# Patient Record
Sex: MALE | Race: WHITE
[De-identification: names, ages, dates, MRNs, and addresses within clinical notes are randomized per-mention and may not be internally consistent; named-entity substitution may affect disease eponyms.]

---

## 2017-01-16 ENCOUNTER — HOSPITAL ENCOUNTER (OUTPATIENT)
Dept: HOSPITAL 17 - PHED | Age: 42
Setting detail: OBSERVATION
Discharge: LEFT BEFORE DISCHARGE | End: 2017-01-16
Attending: HOSPITALIST | Admitting: HOSPITALIST
Payer: MEDICAID

## 2017-01-16 VITALS
DIASTOLIC BLOOD PRESSURE: 82 MMHG | SYSTOLIC BLOOD PRESSURE: 138 MMHG | OXYGEN SATURATION: 100 % | HEART RATE: 91 BPM | RESPIRATION RATE: 18 BRPM

## 2017-01-16 VITALS
OXYGEN SATURATION: 97 % | SYSTOLIC BLOOD PRESSURE: 127 MMHG | DIASTOLIC BLOOD PRESSURE: 78 MMHG | HEART RATE: 74 BPM | RESPIRATION RATE: 18 BRPM

## 2017-01-16 VITALS
OXYGEN SATURATION: 96 % | RESPIRATION RATE: 18 BRPM | DIASTOLIC BLOOD PRESSURE: 84 MMHG | HEART RATE: 78 BPM | SYSTOLIC BLOOD PRESSURE: 133 MMHG

## 2017-01-16 VITALS
OXYGEN SATURATION: 99 % | DIASTOLIC BLOOD PRESSURE: 82 MMHG | SYSTOLIC BLOOD PRESSURE: 128 MMHG | HEART RATE: 94 BPM | RESPIRATION RATE: 18 BRPM

## 2017-01-16 VITALS
OXYGEN SATURATION: 98 % | DIASTOLIC BLOOD PRESSURE: 84 MMHG | SYSTOLIC BLOOD PRESSURE: 127 MMHG | HEART RATE: 87 BPM | RESPIRATION RATE: 18 BRPM

## 2017-01-16 VITALS
DIASTOLIC BLOOD PRESSURE: 77 MMHG | SYSTOLIC BLOOD PRESSURE: 154 MMHG | HEART RATE: 85 BPM | RESPIRATION RATE: 18 BRPM | OXYGEN SATURATION: 97 %

## 2017-01-16 VITALS — DIASTOLIC BLOOD PRESSURE: 66 MMHG | SYSTOLIC BLOOD PRESSURE: 119 MMHG

## 2017-01-16 VITALS
RESPIRATION RATE: 18 BRPM | OXYGEN SATURATION: 95 % | HEART RATE: 76 BPM | DIASTOLIC BLOOD PRESSURE: 85 MMHG | SYSTOLIC BLOOD PRESSURE: 134 MMHG

## 2017-01-16 VITALS — RESPIRATION RATE: 18 BRPM

## 2017-01-16 VITALS
OXYGEN SATURATION: 95 % | DIASTOLIC BLOOD PRESSURE: 64 MMHG | HEART RATE: 69 BPM | SYSTOLIC BLOOD PRESSURE: 136 MMHG | RESPIRATION RATE: 18 BRPM

## 2017-01-16 DIAGNOSIS — Z86.73: ICD-10-CM

## 2017-01-16 DIAGNOSIS — I25.10: ICD-10-CM

## 2017-01-16 DIAGNOSIS — R94.31: ICD-10-CM

## 2017-01-16 DIAGNOSIS — I10: ICD-10-CM

## 2017-01-16 DIAGNOSIS — G45.9: Primary | ICD-10-CM

## 2017-01-16 DIAGNOSIS — Z82.49: ICD-10-CM

## 2017-01-16 DIAGNOSIS — Z72.0: ICD-10-CM

## 2017-01-16 DIAGNOSIS — I25.2: ICD-10-CM

## 2017-01-16 DIAGNOSIS — G56.02: ICD-10-CM

## 2017-01-16 LAB
AMPHETAMINE, URINE: (no result)
ANION GAP SERPL CALC-SCNC: 7 MEQ/L (ref 5–15)
APTT BLD: 26 SEC (ref 24.3–30.1)
BARBITURATES, URINE: (no result)
BASOPHILS # BLD AUTO: 0 TH/MM3 (ref 0–0.2)
BASOPHILS NFR BLD: 0.6 % (ref 0–2)
BUN SERPL-MCNC: 10 MG/DL (ref 7–18)
CHLORIDE SERPL-SCNC: 104 MEQ/L (ref 98–107)
CK SERPL-CCNC: 46 U/L (ref 39–308)
COCAINE UR-MCNC: (no result) NG/ML
COLOR UR: YELLOW
EOSINOPHIL # BLD: 0.2 TH/MM3 (ref 0–0.4)
EOSINOPHIL NFR BLD: 3.2 % (ref 0–4)
ERYTHROCYTE [DISTWIDTH] IN BLOOD BY AUTOMATED COUNT: 14.6 % (ref 11.6–17.2)
GFR SERPLBLD BASED ON 1.73 SQ M-ARVRAT: 107 ML/MIN (ref 89–?)
GLUCOSE UR STRIP-MCNC: 500 MG/DL
HCO3 BLD-SCNC: 32.3 MEQ/L (ref 21–32)
HCT VFR BLD CALC: 45.6 % (ref 39–51)
HDLC SERPL-MCNC: 29.6 MG/DL (ref 40–60)
HEMO FLAGS: (no result)
HGB UR QL STRIP: (no result)
INR PPP: 0.9 RATIO
KETONES UR STRIP-MCNC: (no result) MG/DL
LYMPHOCYTES # BLD AUTO: 2.3 TH/MM3 (ref 1–4.8)
LYMPHOCYTES NFR BLD AUTO: 29.9 % (ref 9–44)
MCH RBC QN AUTO: 27.6 PG (ref 27–34)
MCHC RBC AUTO-ENTMCNC: 32.8 % (ref 32–36)
MCV RBC AUTO: 84 FL (ref 80–100)
METHOD OF COLLECTION: (no result)
MONOCYTES NFR BLD: 7.2 % (ref 0–8)
MUCOUS THREADS #/AREA URNS LPF: (no result) /LPF
NEUTROPHILS # BLD AUTO: 4.6 TH/MM3 (ref 1.8–7.7)
NEUTROPHILS NFR BLD AUTO: 59.1 % (ref 16–70)
NITRITE UR QL STRIP: (no result)
PLATELET # BLD: 238 TH/MM3 (ref 150–450)
POTASSIUM SERPL-SCNC: 3.1 MEQ/L (ref 3.5–5.1)
PROTHROMBIN TIME: 10 SEC (ref 9.8–11.6)
RBC # BLD AUTO: 5.43 MIL/MM3 (ref 4.5–5.9)
SODIUM SERPL-SCNC: 143 MEQ/L (ref 136–145)
SP GR UR STRIP: 1.03 (ref 1–1.03)
SQUAMOUS #/AREA URNS HPF: (no result) /HPF (ref 0–5)
WBC # BLD AUTO: 7.7 TH/MM3 (ref 4–11)

## 2017-01-16 PROCEDURE — 93005 ELECTROCARDIOGRAM TRACING: CPT

## 2017-01-16 PROCEDURE — 80061 LIPID PANEL: CPT

## 2017-01-16 PROCEDURE — 84484 ASSAY OF TROPONIN QUANT: CPT

## 2017-01-16 PROCEDURE — 80307 DRUG TEST PRSMV CHEM ANLYZR: CPT

## 2017-01-16 PROCEDURE — 85025 COMPLETE CBC W/AUTO DIFF WBC: CPT

## 2017-01-16 PROCEDURE — G0480 DRUG TEST DEF 1-7 CLASSES: HCPCS

## 2017-01-16 PROCEDURE — 82550 ASSAY OF CK (CPK): CPT

## 2017-01-16 PROCEDURE — 85730 THROMBOPLASTIN TIME PARTIAL: CPT

## 2017-01-16 PROCEDURE — 85610 PROTHROMBIN TIME: CPT

## 2017-01-16 PROCEDURE — 96374 THER/PROPH/DIAG INJ IV PUSH: CPT

## 2017-01-16 PROCEDURE — 80048 BASIC METABOLIC PNL TOTAL CA: CPT

## 2017-01-16 PROCEDURE — 99285 EMERGENCY DEPT VISIT HI MDM: CPT

## 2017-01-16 PROCEDURE — 81001 URINALYSIS AUTO W/SCOPE: CPT

## 2017-01-16 PROCEDURE — 71010: CPT

## 2017-01-16 PROCEDURE — 70450 CT HEAD/BRAIN W/O DYE: CPT

## 2017-01-16 PROCEDURE — G0378 HOSPITAL OBSERVATION PER HR: HCPCS

## 2017-01-16 PROCEDURE — 96375 TX/PRO/DX INJ NEW DRUG ADDON: CPT

## 2017-01-16 PROCEDURE — 80329 ANALGESICS NON-OPIOID 1 OR 2: CPT

## 2017-01-16 NOTE — RADHPO
EXAM DATE/TIME:  01/16/2017 02:00 

 

[facility] COMPARISON:     

CT BRAIN W/O CONTRAST, November 03, 2016, 9:15.

 

 

INDICATIONS :     

Left frontal headache for two weeks. 

                      

 

RADIATION DOSE:     

61.31 CTDIvol (mGy) 

 

 

 

MEDICAL HISTORY :     

Hypertension. Myocardial infarction. 

 

SURGICAL HISTORY :      

None. 

 

ENCOUNTER:      

Initial

 

ACUITY:      

1 day

 

PAIN SCALE:      

7/10

 

LOCATION:       

Left frontal 

 

TECHNIQUE:     

Multiple contiguous axial images were obtained of the head.  Using automated exposure control and adj
ustment of the mA and/or kV according to patient size, radiation dose was kept as low as reasonably a
chievable to obtain optimal diagnostic quality images. 

 

FINDINGS:     

 

CEREBRUM:     

The ventricles are normal for age.  No evidence of midline shift, mass lesion, hemorrhage or acute in
farction.  No extra-axial fluid collections are seen.

 

POSTERIOR FOSSA:     

The cerebellum and brainstem are intact.  The 4th ventricle is midline.  The cerebellopontine angle i
s unremarkable.

 

EXTRACRANIAL:     

The visualized portion of the orbits is intact.

 

SKULL:     

The calvaria is intact.  No evidence of skull fracture.

 

CONCLUSION:     

Normal examination.  

 

 

 

 Adan Haro MD on January 16, 2017 at 2:20           

Board Certified Radiologist.

 This report was verified electronically.

## 2017-01-16 NOTE — EKG
Date Performed: 01/16/2017       Time Performed: 01:30:44

 

PTAGE:      41 years

 

EKG:      Sinus rhythm 

 

 Inferior ST-T changes are nonspecific Borderline ECG

 

PREVIOUS TRACING       : 11/03/2016 10.55

 

DOCTOR:   Stephen Guzman  Interpretating Date/Time  01/16/2017 19:20:52

## 2017-01-16 NOTE — PD
HPI


Chief Complaint:  Numbness/Tingling


Time Seen by Provider:  01:23


Travel History


International Travel<30 days:  No


Contact w/Intl Traveler<30days:  No


Traveled to known affect area:  No





History of Present Illness


HPI


41-year-old male presents to the emergency department by private transportation 

for complaint of facial paresthesias and sensation of fullness of the face and 

lower lip as well as tingling and numbness affecting the left upper extremity 

especially in the left index and thumb distribution.  Patient reports that for 

the past 2-3 months she's been having headaches and for the past month or so he'

s been having facial tingling and left upper extremity numbness and tingling.  

Patient's had no weakness.  Patient's had no facial droop.  Patient's had no 

change in mentation or vision.  Patient's had no change in speech.  Patient 

states that since starting a new medication for blood pressure approximately a 

month ago paresthesias have begun and he is very concerned that he is having an 

adverse medication reaction.  Patient was seen in the emergency department in 

December for a respiratory illness and started on blood pressure medication.  

Patient has not followed up with his primary care provider since his ER visit.  

Patient states that he has tried to be compliant with his blood pressure 

medication.  Patient reports that wife told him he had a fever of 102 

approximately 2 or 3 days ago.  Patient has experienced facial pressure 

affecting the forehead and cheeks.  Patient reports having had a sore throat 

which she no longer has.  Patient denies any ear pain.  No report of epistaxis.

  Patient's had no chest pain shortness of breath palpitations sweats or nausea 

or vomiting.  Patient denies any lower extremity symptoms.  Patient denies any 

history of neck injury.  Patient states symptoms seem to worsen today as he was 

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  Patient is 

right-handed but states he was using both upper extremities repetitively in 

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  Patient states he started to feel 

fatigued around 4 and went to sleep at 5 PM Sunday evening.  Patient reports 

that his wife reportedly attempted to awaken him for dinner but he did not want 

to get up and she stated that he seemed to have some slurred speech at that 

time but he was too sleepy to get out of bed and did not eat dinner.  Patient 

reportedly awakened around 12:30 AM this morning and noticed that he felt like 

he had increased tingling of the face and left upper extremity compared to the 

symptoms he had had before going to bed around 5 PM.  Patient also continues to 

have ongoing headache.  Headache is not sudden onset or thunderclap or worst 

ever headache have been intermittent and persistent over the past 2-3 months.  

Patient denies any injury or fall.  Patient has extensive past medical history 

including remote TIA when he was age 32 is undergone cardiac catheterization 

without any stenting or angioplasty patient has borderline diabetes and 

hypertension and admits to daily cigar use.  Family history is positive for 

cardiac disease in his father who passed away at age 90 from myocardial 

infarction his mother still living in her 70s but also has coronary vessel 

disease.  Patient reports he has been taking acetaminophen or aspirin as needed 

for headache pain.





PFSH


Past Medical History


*** Narrative Medical


CAD, hypertension, borderline diabetes, pneumonia, tobacco use, cardiac 

catheterization without stenting or angioplasty, family history myocardial 

infarction and dyslipidemia; nursing notes reviewed


Hx Anticoagulant Therapy:  No


Heart Rhythm Problems:  No


Cardiac Catheterization:  Yes


Cardiovascular Problems:  Yes (MI, HTN,*PT DENIES HAVE AN MI)


High Cholesterol:  No


Congestive Heart Failure:  No


Cerebrovascular Accident:  Yes (TIA 2013)


Diabetes:  No


Diminished Hearing:  No


Gastrointestinal Disorders:  Yes (ABDOMINAL HERNIA)


Heparin Induced Thrombocytopen:  No


Hypertension:  Yes


Immunizations Current:  Yes


Myocardial Infarction:  Yes


Pneumonia:  Yes (WITH MRSA)


Influenza Vaccination:  No





Past Surgical History


Cardiac Surgery:  Yes (HEART CATHERIZATION)


Coronary Artery Bypass Graft:  No


Thoracic Surgery:  Yes (RIGHT KNEE)





Family History


Family Myocardial Infarction:  Yes (FATHER AND GRANDFATHER)


Family Hypercholesterolemia:  Yes





Social History


Alcohol Use:  No


Tobacco Use:  Yes (3-4 CIGARS /DAY)


Substance Use:  No





Allergies-Medications


(Allergen,Severity, Reaction):  


Coded Allergies:  


     Penicillin (Verified  Allergy, Severe, Anaphylaxis, 1/16/17)


     Sulfa (Verified  Allergy, Severe, Anaphylaxis, 1/16/17)


Reported Meds & Prescriptions





Reported Meds & Active Scripts


Active


Percocet (Oxycodone-Acetaminophen) 5-325 mg Tab 1 Tab PO Q4H PRN


Norvasc (Amlodipine Besylate) 10 Mg Tab 10 Mg PO DAILY


Reported


Aspirin 325 Mg Tab 1,300 Mg PO DAILY








Review of Systems


Except as stated in HPI:  all other systems reviewed are Neg


General / Constitutional:  Positive: Fever (10 2F a few days ago reportedly),  

No: Chills


HENT:  Positive: Headaches (frequently to daily over the past 2-3 months), Sore 

Throat (a few days ago reportedly),  No: Congestion


Cardiovascular:  No: Chest Pain or Discomfort, Palpitations, Diaphoresis, 

Syncope, Dyspnea on exertion


Respiratory:  No: Shortness of Breath


Gastrointestinal:  No: Nausea, Vomiting, Abdominal Pain


Genitourinary:  No: Flank Pain


Musculoskeletal:  No: Myalgias, Arthralgias, Limited ROM


Skin:  No Rash


Neurologic:  Positive: Headache, Paresthesia (left face and left upper 

extremity 2-3 weeks),  No: Weakness, Dizziness, Syncope, Focal Abnormalities, 

Ataxia, Change in Mentation, Slurred Speech


Psychiatric:  No: Anxiety


Hematologic/Lymphatic:  No: Lymph Node Enlargement





Physical Exam


Narrative


GENERAL: Well-developed well-nourished male in no acute distress no respiratory 

distress; GCS 15


SKIN: Warm and dry.


HEAD: Atraumatic. Normocephalic. 


EYES: Pupils equal and round.  Extraocular muscles intact.  No scleral icterus. 

No injection or drainage. 


ENT: No nasal bleeding or discharge.  Mucous membranes pink and moist.  

Tympanic membranes no dullness no redness no loss of landmarks.  Sinuses tender 

to percussion of the frontal and maxillary sinuses left greater than right.


NECK: Trachea midline. No JVD.  Supple no meningismus no nuchal rigidity


CARDIOVASCULAR: Regular rate and rhythm.  


RESPIRATORY: No accessory muscle use. Clear to auscultation. Breath sounds 

equal bilaterally. 


GASTROINTESTINAL: Abdomen soft, non-tender, nondistended. Hepatic and splenic 

margins not palpable. 


MUSCULOSKELETAL: Extremities without clubbing, cyanosis, or edema. No obvious 

deformities.  Patient does have positive provocative testing for left carpal 

tunnel syndrome with positive Phalen's and Tinel's testing.  Bilateral radial 

and dorsalis pedis pulses 2+ to palpation.


NEUROLOGICAL: Awake and alert.  GCS 15.  No obvious cranial nerve deficits.  

Motor grossly within normal limits. Five out of 5 muscle strength in the arms 

and legs.  Normal speech.


PSYCHIATRIC: Appropriate mood and affect; insight and judgment normal.





Data


Data


Last Documented VS





Vital Signs








  Date Time  Temp Pulse Resp B/P Pulse Ox O2 Delivery O2 Flow Rate FiO2


 


1/16/17 05:00  78 18 133/84 96 Room Air  


 


1/16/17 04:10        21








Orders





 Electrocardiogram (1/16/17 01:23)


Prothrombin Time / Inr (Pt) (1/16/17 01:23)


Act Partial Throm Time (Ptt) (1/16/17 01:23)


Complete Blood Count With Diff (1/16/17 01:23)


Basic Metabolic Panel (Bmp) (1/16/17 01:23)


Creatine Kinase (Cpk) (1/16/17 01:23)


Troponin I (1/16/17 01:23)


Ua Includes Microscopic (1/16/17 01:23)


Ct Brain W/O Iv Contrast(Rout) (1/16/17 01:23)


Chest, Single Ap (1/16/17 01:23)


Ecg Monitoring (1/16/17 01:23)


Iv Access Insert/Monitor (1/16/17 01:23)


Oximetry (1/16/17 01:23)


Blood Glucose (1/16/17 01:23)


Sodium Chloride 0.9% Flush (Ns Flush) (1/16/17 01:30)


Potassium Chloride (Kcl) (1/16/17 02:45)


Salicylates (Aspirin) (1/16/17 02:36)


Ondansetron Inj (Zofran Inj) (1/16/17 02:45)


Morphine Inj (Morphine Inj) (1/16/17 02:45)


Ketorolac Inj (Toradol Inj) (1/16/17 04:15)


Drug Screen, Random Urine (1/16/17 04:50)


Place In Observation (1/16/17 )


Vital Signs (Adult) Q4H (1/16/17 04:50)


Neuro Checks Q4H (1/16/17 04:50)


Activity Oob Ad Gita (1/16/17 04:50)


Diet Regular Basic (1/16/17 Breakfast)


Sodium Chlor 0.9% 1000 Ml Inj (Ns 1000 M (1/16/17 04:50)


Sodium Chloride 0.9% Flush (Ns Flush) (1/16/17 05:00)


Sodium Chloride 0.9% Flush (Ns Flush) (1/16/17 09:00)


Ondansetron Inj (Zofran Inj) (1/16/17 05:00)


Bisacodyl Supp (Dulcolax Supp) (1/16/17 05:00)


Comprehensive Metabolic Panel (1/17/17 06:00)


Complete Blood Count With Diff (1/17/17 06:00)


Scd Bilateral/Knee High ABBY.BID (1/16/17 04:50)


Alexandro Bilateral/Knee High ABBY.QSHIFT (1/16/17 04:50)


Acetaminophen (Tylenol) (1/16/17 05:00)


Acetamin-Hydrocod 325-5 Mg (Norco  5-325 (1/16/17 05:00)


Acetamin-Hydrocod 325-7.5 Mg (Norco  7.5 (1/16/17 05:00)


Mri Brain W/O Contrast (1/16/17 )


Mri C Spine W/O Contrast (1/16/17 )


Lipid Profile (1/16/17 04:50)


Aspirin Ec (Ecotrin Ec) (1/16/17 09:00)


Pravastatin (Pravachol) (1/16/17 09:00)


Admit Order (Ed Use Only) (1/16/17 )


^ Saline Lock (1/16/17 05:47)


Resp Oxygen Alexis C Titrat 1-4 L (1/16/17 )


^ Notify Dr: Other (1/16/17 05:47)


Sodium Chloride 0.9% Flush (Ns Flush) (1/16/17 09:00)


Sodium Chloride 0.9% Flush (Ns Flush) (1/16/17 06:00)





Labs





 Laboratory Tests








Test 1/16/17 1/16/17





 01:55 02:20


 


White Blood Count 7.7 TH/MM3 


 


Red Blood Count 5.43 MIL/MM3 


 


Hemoglobin 15.0 GM/DL 


 


Hematocrit 45.6 % 


 


Mean Corpuscular Volume 84.0 FL 


 


Mean Corpuscular Hemoglobin 27.6 PG 


 


Mean Corpuscular Hemoglobin 32.8 % 





Concent  


 


Red Cell Distribution Width 14.6 % 


 


Platelet Count 238 TH/MM3 


 


Mean Platelet Volume 9.0 FL 


 


Neutrophils (%) (Auto) 59.1 % 


 


Lymphocytes (%) (Auto) 29.9 % 


 


Monocytes (%) (Auto) 7.2 % 


 


Eosinophils (%) (Auto) 3.2 % 


 


Basophils (%) (Auto) 0.6 % 


 


Neutrophils # (Auto) 4.6 TH/MM3 


 


Lymphocytes # (Auto) 2.3 TH/MM3 


 


Monocytes # (Auto) 0.6 TH/MM3 


 


Eosinophils # (Auto) 0.2 TH/MM3 


 


Basophils # (Auto) 0.0 TH/MM3 


 


CBC Comment DIFF FINAL  


 


Differential Comment   


 


Prothrombin Time 10.0 SEC 


 


Prothromb Time International 0.9 RATIO 





Ratio  


 


Activated Partial 26.0 SEC 





Thromboplast Time  


 


Sodium Level 143 MEQ/L 


 


Potassium Level 3.1 MEQ/L 


 


Chloride Level 104 MEQ/L 


 


Carbon Dioxide Level 32.3 MEQ/L 


 


Anion Gap 7 MEQ/L 


 


Blood Urea Nitrogen 10 MG/DL 


 


Creatinine 0.80 MG/DL 


 


Estimat Glomerular Filtration 107 ML/MIN 





Rate  


 


Random Glucose 217 MG/DL 


 


Calcium Level 8.5 MG/DL 


 


Total Creatine Kinase 46 U/L 


 


Troponin I LESS THAN 0.02 





 NG/ML 


 


Triglycerides Level 401 MG/DL 


 


Cholesterol Level 195 MG/DL 


 


LDL Cholesterol  MG/DL 


 


HDL Cholesterol 29.6 MG/DL 


 


Cholesterol/HDL Ratio 6.58 RATIO 


 


Salicylates Level 2.4 MG/DL 


 


Urine Opiates Screen NEG  


 


Urine Barbiturates Screen NEG  


 


Urine Amphetamines Screen NEG  


 


Urine Benzodiazepines Screen NEG  


 


Urine Cocaine Screen NEG  


 


Urine Cannabinoids Screen NEG  


 


Urine Collection Type  CATH 


 


Urine Color  YELLOW 


 


Urine Turbidity  CLEAR 


 


Urine pH  6.0 


 


Urine Specific Gravity  1.031 


 


Urine Protein  NEG mg/dL


 


Urine Glucose (UA)  500 mg/dL


 


Urine Ketones  NEG mg/dL


 


Urine Occult Blood  NEG 


 


Urine Nitrite  NEG 


 


Urine Bilirubin  NEG 


 


Urine Leukocyte Esterase  NEG 


 


Urine Squamous Epithelial  0-5 /hpf





Cells  


 


Urine Mucus  MOD /lpf











MDM


Medical Decision Making


Medical Screen Exam Complete:  Yes


Emergency Medical Condition:  Yes


Medical Record Reviewed:  Yes


Interpretation(s)


EKG normal sinus rhythm rate 85 nonspecific inferior ST T changes unchanged 

from 1/13/16


Last Impressions








Head CT 1/16/17 0123 Signed





Impressions: 





 Service Date/Time:  Monday, January 16, 2017 02:00 - CONCLUSION:  Normal 





 examination.       Adan Haro MD 


 


Chest X-Ray 1/16/17 0123 Signed





Impressions: 





 Service Date/Time:  Monday, January 16, 2017 02:07 - CONCLUSION: No acute 





 disease.       Adan Haro MD 








Laboratory Tests








Test 1/16/17 1/16/17





 01:55 02:20


 


White Blood Count 7.7 TH/MM3 


 


Red Blood Count 5.43 MIL/MM3 


 


Hemoglobin 15.0 GM/DL 


 


Hematocrit 45.6 % 


 


Mean Corpuscular Volume 84.0 FL 


 


Mean Corpuscular Hemoglobin 27.6 PG 


 


Mean Corpuscular Hemoglobin 32.8 % 





Concent  


 


Red Cell Distribution Width 14.6 % 


 


Platelet Count 238 TH/MM3 


 


Mean Platelet Volume 9.0 FL 


 


Neutrophils (%) (Auto) 59.1 % 


 


Lymphocytes (%) (Auto) 29.9 % 


 


Monocytes (%) (Auto) 7.2 % 


 


Eosinophils (%) (Auto) 3.2 % 


 


Basophils (%) (Auto) 0.6 % 


 


Neutrophils # (Auto) 4.6 TH/MM3 


 


Lymphocytes # (Auto) 2.3 TH/MM3 


 


Monocytes # (Auto) 0.6 TH/MM3 


 


Eosinophils # (Auto) 0.2 TH/MM3 


 


Basophils # (Auto) 0.0 TH/MM3 


 


CBC Comment DIFF FINAL  


 


Differential Comment   


 


Prothrombin Time 10.0 SEC 


 


Prothromb Time International 0.9 RATIO 





Ratio  


 


Activated Partial 26.0 SEC 





Thromboplast Time  


 


Sodium Level 143 MEQ/L 


 


Potassium Level 3.1 MEQ/L 


 


Chloride Level 104 MEQ/L 


 


Carbon Dioxide Level 32.3 MEQ/L 


 


Anion Gap 7 MEQ/L 


 


Blood Urea Nitrogen 10 MG/DL 


 


Creatinine 0.80 MG/DL 


 


Estimat Glomerular Filtration 107 ML/MIN 





Rate  


 


Random Glucose 217 MG/DL 


 


Calcium Level 8.5 MG/DL 


 


Total Creatine Kinase 46 U/L 


 


Troponin I LESS THAN 0.02 





 NG/ML 


 


Urine Collection Type  CATH 


 


Urine Color  YELLOW 


 


Urine Turbidity  CLEAR 


 


Urine pH  6.0 


 


Urine Specific Gravity  1.031 


 


Urine Protein  NEG mg/dL


 


Urine Glucose (UA)  500 mg/dL


 


Urine Ketones  NEG mg/dL


 


Urine Occult Blood  NEG 


 


Urine Nitrite  NEG 


 


Urine Bilirubin  NEG 


 


Urine Leukocyte Esterase  NEG 


 


Urine Squamous Epithelial  0-5 /hpf





Cells  


 


Urine Mucus  MOD /lpf








Last Impressions








Head CT 1/16/17 0123 Signed





Impressions: 





 Service Date/Time:  Monday, January 16, 2017 02:00 - CONCLUSION:  Normal 





 examination.       Adan Haro MD 


 


Chest X-Ray 1/16/17 0123 Signed





Impressions: 





 Service Date/Time:  Monday, January 16, 2017 02:07 - CONCLUSION: No acute 





 disease.       Adan Haro MD 








Differential Diagnosis


Paresthesia, Bell's palsy, TIA, CVA, cervical radiculopathy, adverse medication 

reaction, sinusitis, atypical chest pain, ACS


Narrative Course


41-year-old male with extensive past medical history with 2-3 months of 

headache and now with 2-3 weeks of paresthesias with physical exam consistent 

with left carpal tunnel syndrome and recent respiratory illness with fever 

noted on Friday.  Patient otherwise without focality on exam.  We'll proceed 

with imaging, CT brain, EKG and lab tests.


Patient reported to his nurse that he has been taking frequent large doses of 

aspirin and reportedly today took 1300 mg of aspirin; patient does admit to 

increased use of barrier aspirin and taking it frequently throughout the day 

however when queried states that he took 2 tablets of Moe early onset 

pneumonia morning and 2 tablets of Moe regular strength aspirin at 5 PM 

before going to bed and has not taken any more aspirin in the last 24-48 hours.


Salicylate level added to the lab tests


Salicylate level 2.4 not elevated


Patient had been given morphine 3 mg IV times one dose for headache pain; 

patient also administered Toradol 30 mg iv x 1 dose


@ 6:55 AM AMA: The risks of leaving against medical advice without further 

evaluation treatment were discussed with the patient.  These risks include 

cardiac dysfunction, cardiac dysrhythmia, possible heart attack, possible 

stroke or death.  The patient indicated understanding of these risks and 

appeared to have the capacity to make this decision.





Physician Communication


Physician Communication


discussed with Dr Botello for OBS





Diagnosis





 Primary Impression:  


 Paresthesias/numbness


 Additional Impressions:  


 Hyperglycemia


 History of tobacco use


 Carpal tunnel syndrome on left


 TIA (transient ischemic attack)


 Qualified Code:  G45.9 - Transient cerebral ischemia, unspecified type





Admitting Information


Admitting Physician Requests:  Observation


Disposition:  07 AGAINST MEDICAL ADVICE


Condition:  Stable








Nadia Conklin MD Jan 16, 2017 03:05

## 2017-01-16 NOTE — RADHPO
EXAM DATE/TIME:  01/16/2017 02:07 

 

[facility] COMPARISON:     

CHEST PA & LAT, December 29, 2016, 0:26.

 

                     

INDICATIONS :     

Left sided chest pain

                     

 

MEDICAL HISTORY :     

None.          

 

SURGICAL HISTORY :     

None.   

 

ENCOUNTER:     

Initial                                        

 

ACUITY:     

1 day      

 

PAIN SCORE:     

6/10

 

LOCATION:     

Bilateral chest 

 

FINDINGS:     

A single view of the chest demonstrates the lungs to be symmetrically aerated without evidence of mas
s, infiltrate or effusion.  The cardiomediastinal contours are unremarkable.  Osseous structures are 
intact.

 

CONCLUSION:     No acute disease.  

 

 

 

 Adan Haro MD on January 16, 2017 at 2:24           

Board Certified Radiologist.

 This report was verified electronically.

## 2017-01-17 ENCOUNTER — HOSPITAL ENCOUNTER (OUTPATIENT)
Dept: HOSPITAL 17 - PHED | Age: 42
Setting detail: OBSERVATION
LOS: 3 days | Discharge: HOME | End: 2017-01-20
Attending: FAMILY MEDICINE | Admitting: FAMILY MEDICINE
Payer: COMMERCIAL

## 2017-01-17 VITALS
OXYGEN SATURATION: 98 % | DIASTOLIC BLOOD PRESSURE: 80 MMHG | RESPIRATION RATE: 18 BRPM | SYSTOLIC BLOOD PRESSURE: 146 MMHG | HEART RATE: 82 BPM

## 2017-01-17 VITALS — OXYGEN SATURATION: 96 % | RESPIRATION RATE: 18 BRPM

## 2017-01-17 VITALS
TEMPERATURE: 98.3 F | RESPIRATION RATE: 18 BRPM | HEART RATE: 86 BPM | DIASTOLIC BLOOD PRESSURE: 82 MMHG | OXYGEN SATURATION: 96 % | SYSTOLIC BLOOD PRESSURE: 136 MMHG

## 2017-01-17 VITALS
SYSTOLIC BLOOD PRESSURE: 149 MMHG | HEART RATE: 78 BPM | RESPIRATION RATE: 18 BRPM | OXYGEN SATURATION: 97 % | DIASTOLIC BLOOD PRESSURE: 67 MMHG

## 2017-01-17 VITALS — BODY MASS INDEX: 36.24 KG/M2 | WEIGHT: 244.71 LBS | HEIGHT: 69 IN

## 2017-01-17 DIAGNOSIS — R20.0: Primary | ICD-10-CM

## 2017-01-17 DIAGNOSIS — R53.81: ICD-10-CM

## 2017-01-17 DIAGNOSIS — R51: ICD-10-CM

## 2017-01-17 DIAGNOSIS — I15.9: ICD-10-CM

## 2017-01-17 DIAGNOSIS — R07.89: ICD-10-CM

## 2017-01-17 DIAGNOSIS — R29.810: ICD-10-CM

## 2017-01-17 DIAGNOSIS — Z79.84: ICD-10-CM

## 2017-01-17 DIAGNOSIS — E11.65: ICD-10-CM

## 2017-01-17 DIAGNOSIS — R47.81: ICD-10-CM

## 2017-01-17 DIAGNOSIS — I25.2: ICD-10-CM

## 2017-01-17 DIAGNOSIS — Z86.73: ICD-10-CM

## 2017-01-17 DIAGNOSIS — R53.1: ICD-10-CM

## 2017-01-17 DIAGNOSIS — E78.2: ICD-10-CM

## 2017-01-17 DIAGNOSIS — E87.6: ICD-10-CM

## 2017-01-17 DIAGNOSIS — F17.290: ICD-10-CM

## 2017-01-17 LAB
AMPHETAMINE, URINE: (no result)
ANION GAP SERPL CALC-SCNC: 7 MEQ/L (ref 5–15)
BARBITURATES, URINE: (no result)
BASOPHILS # BLD AUTO: 0 TH/MM3 (ref 0–0.2)
BASOPHILS NFR BLD: 0.7 % (ref 0–2)
BUN SERPL-MCNC: 10 MG/DL (ref 7–18)
CHLORIDE SERPL-SCNC: 103 MEQ/L (ref 98–107)
COCAINE UR-MCNC: (no result) NG/ML
EOSINOPHIL # BLD: 0.2 TH/MM3 (ref 0–0.4)
EOSINOPHIL NFR BLD: 3.2 % (ref 0–4)
ERYTHROCYTE [DISTWIDTH] IN BLOOD BY AUTOMATED COUNT: 13.8 % (ref 11.6–17.2)
GFR SERPLBLD BASED ON 1.73 SQ M-ARVRAT: 83 ML/MIN (ref 89–?)
HCO3 BLD-SCNC: 31.8 MEQ/L (ref 21–32)
HCT VFR BLD CALC: 42.8 % (ref 39–51)
HEMO FLAGS: (no result)
LYMPHOCYTES # BLD AUTO: 1.9 TH/MM3 (ref 1–4.8)
LYMPHOCYTES NFR BLD AUTO: 27.3 % (ref 9–44)
MCH RBC QN AUTO: 27.4 PG (ref 27–34)
MCHC RBC AUTO-ENTMCNC: 33.1 % (ref 32–36)
MCV RBC AUTO: 82.8 FL (ref 80–100)
MONOCYTES NFR BLD: 7 % (ref 0–8)
NEUTROPHILS # BLD AUTO: 4.2 TH/MM3 (ref 1.8–7.7)
NEUTROPHILS NFR BLD AUTO: 61.8 % (ref 16–70)
PLATELET # BLD: 207 TH/MM3 (ref 150–450)
POTASSIUM SERPL-SCNC: 3.2 MEQ/L (ref 3.5–5.1)
RBC # BLD AUTO: 5.18 MIL/MM3 (ref 4.5–5.9)
SODIUM SERPL-SCNC: 142 MEQ/L (ref 136–145)
WBC # BLD AUTO: 6.8 TH/MM3 (ref 4–11)

## 2017-01-17 PROCEDURE — 80061 LIPID PANEL: CPT

## 2017-01-17 PROCEDURE — A9579 GAD-BASE MR CONTRAST NOS,1ML: HCPCS

## 2017-01-17 PROCEDURE — 70544 MR ANGIOGRAPHY HEAD W/O DYE: CPT

## 2017-01-17 PROCEDURE — 70548 MR ANGIOGRAPHY NECK W/DYE: CPT

## 2017-01-17 PROCEDURE — 86147 CARDIOLIPIN ANTIBODY EA IG: CPT

## 2017-01-17 PROCEDURE — 85730 THROMBOPLASTIN TIME PARTIAL: CPT

## 2017-01-17 PROCEDURE — 96375 TX/PRO/DX INJ NEW DRUG ADDON: CPT

## 2017-01-17 PROCEDURE — 85025 COMPLETE CBC W/AUTO DIFF WBC: CPT

## 2017-01-17 PROCEDURE — 82948 REAGENT STRIP/BLOOD GLUCOSE: CPT

## 2017-01-17 PROCEDURE — 70546 MR ANGIOGRAPH HEAD W/O&W/DYE: CPT

## 2017-01-17 PROCEDURE — 86038 ANTINUCLEAR ANTIBODIES: CPT

## 2017-01-17 PROCEDURE — 80307 DRUG TEST PRSMV CHEM ANLYZR: CPT

## 2017-01-17 PROCEDURE — 93005 ELECTROCARDIOGRAM TRACING: CPT

## 2017-01-17 PROCEDURE — 97110 THERAPEUTIC EXERCISES: CPT

## 2017-01-17 PROCEDURE — 86592 SYPHILIS TEST NON-TREP QUAL: CPT

## 2017-01-17 PROCEDURE — 81240 F2 GENE: CPT

## 2017-01-17 PROCEDURE — 83036 HEMOGLOBIN GLYCOSYLATED A1C: CPT

## 2017-01-17 PROCEDURE — 80048 BASIC METABOLIC PNL TOTAL CA: CPT

## 2017-01-17 PROCEDURE — 80053 COMPREHEN METABOLIC PANEL: CPT

## 2017-01-17 PROCEDURE — 72141 MRI NECK SPINE W/O DYE: CPT

## 2017-01-17 PROCEDURE — 82746 ASSAY OF FOLIC ACID SERUM: CPT

## 2017-01-17 PROCEDURE — 96374 THER/PROPH/DIAG INJ IV PUSH: CPT

## 2017-01-17 PROCEDURE — 85613 RUSSELL VIPER VENOM DILUTED: CPT

## 2017-01-17 PROCEDURE — 97165 OT EVAL LOW COMPLEX 30 MIN: CPT

## 2017-01-17 PROCEDURE — 84207 ASSAY OF VITAMIN B-6: CPT

## 2017-01-17 PROCEDURE — 85303 CLOT INHIBIT PROT C ACTIVITY: CPT

## 2017-01-17 PROCEDURE — 70450 CT HEAD/BRAIN W/O DYE: CPT

## 2017-01-17 PROCEDURE — 93306 TTE W/DOPPLER COMPLETE: CPT

## 2017-01-17 PROCEDURE — 82550 ASSAY OF CK (CPK): CPT

## 2017-01-17 PROCEDURE — 99285 EMERGENCY DEPT VISIT HI MDM: CPT

## 2017-01-17 PROCEDURE — 92526 ORAL FUNCTION THERAPY: CPT

## 2017-01-17 PROCEDURE — 84443 ASSAY THYROID STIM HORMONE: CPT

## 2017-01-17 PROCEDURE — 70553 MRI BRAIN STEM W/O & W/DYE: CPT

## 2017-01-17 PROCEDURE — 85240 CLOT FACTOR VIII AHG 1 STAGE: CPT

## 2017-01-17 PROCEDURE — 97530 THERAPEUTIC ACTIVITIES: CPT

## 2017-01-17 PROCEDURE — 85652 RBC SED RATE AUTOMATED: CPT

## 2017-01-17 PROCEDURE — 92610 EVALUATE SWALLOWING FUNCTION: CPT

## 2017-01-17 PROCEDURE — 81241 F5 GENE: CPT

## 2017-01-17 PROCEDURE — 82607 VITAMIN B-12: CPT

## 2017-01-17 PROCEDURE — 84484 ASSAY OF TROPONIN QUANT: CPT

## 2017-01-17 PROCEDURE — 85300 ANTITHROMBIN III ACTIVITY: CPT

## 2017-01-17 PROCEDURE — G0378 HOSPITAL OBSERVATION PER HR: HCPCS

## 2017-01-17 PROCEDURE — 93225 XTRNL ECG REC<48 HRS REC: CPT

## 2017-01-17 PROCEDURE — 97162 PT EVAL MOD COMPLEX 30 MIN: CPT

## 2017-01-17 PROCEDURE — 93226 XTRNL ECG REC<48 HR SCAN A/R: CPT

## 2017-01-17 PROCEDURE — 85306 CLOT INHIBIT PROT S FREE: CPT

## 2017-01-17 PROCEDURE — 84425 ASSAY OF VITAMIN B-1: CPT

## 2017-01-17 NOTE — PD
HPI


Chief Complaint:  Numbness/Tingling


Time Seen by Provider:  20:26


Travel History


International Travel<30 days:  No


Contact w/Intl Traveler<30days:  No


Traveled to known affect area:  No





History of Present Illness


HPI


The patient is 41 years old.  He arrives to the ER arthritic complaints 

including numbness in the left arm, weakness in the left arm and left lower 

extremity and left facial droop.  Symptoms have present for about 2 days.  

Generalized cephalgia is reported gradual in onset.  He's had no fever or 

stiffness of the neck.  He was seen here 2 days ago and worked up for the same 

complaint however left AGAINST MEDICAL ADVICE due to work obligations.  No 

progression of symptoms severity is noted.  A generalized sense of malaise is 

reported.  The patient was unable to work tonight and came here to be 

evaluated.  He denies drug abuse.





PFSH


Past Medical History


Hx Anticoagulant Therapy:  No


Heart Rhythm Problems:  No


Cardiac Catheterization:  Yes


Cardiovascular Problems:  Yes (MI, HTN,*PT DENIES HAVE AN MI)


High Cholesterol:  No


Congestive Heart Failure:  No


Cerebrovascular Accident:  Yes (TIA 2013)


Diabetes:  No


Diminished Hearing:  No


Gastrointestinal Disorders:  Yes (ABDOMINAL HERNIA)


Heparin Induced Thrombocytopen:  No


Hypertension:  Yes


Immunizations Current:  Yes


Myocardial Infarction:  Yes


Pneumonia:  Yes (WITH MRSA)





Past Surgical History


Cardiac Surgery:  Yes (HEART CATHERIZATION)


Coronary Artery Bypass Graft:  No


Thoracic Surgery:  Yes (RIGHT KNEE)





Family History


Family Hypercholesterolemia:  Yes





Social History


Alcohol Use:  No


Tobacco Use:  Yes (3-4 CIGARS /DAY)


Substance Use:  No





Allergies-Medications


(Allergen,Severity, Reaction):  


Coded Allergies:  


     Penicillin (Verified  Allergy, Severe, Anaphylaxis, 1/17/17)


     Sulfa (Verified  Allergy, Severe, Anaphylaxis, 1/17/17)


Reported Meds & Prescriptions





Reported Meds & Active Scripts


Active


Percocet (Oxycodone-Acetaminophen) 5-325 mg Tab 1 Tab PO Q4H PRN


Norvasc (Amlodipine Besylate) 10 Mg Tab 10 Mg PO DAILY


Reported


Vitamin C (Ascorbic Acid) 100 Mg Chew 100 Mg CHEW DAILY








Review of Systems


Except as stated in HPI:  all other systems reviewed are Neg





Physical Exam


Narrative


GENERAL: 41-year-old male well-nourished well-developed


SKIN: Warm and dry.


HEAD: Atraumatic. Normocephalic. 


EYES: Pupils equal and round. No scleral icterus. No injection or drainage. 


ENT: No nasal bleeding or discharge.  Mucous membranes pink and moist.


NECK: Trachea midline. No JVD. 


CARDIOVASCULAR: Regular rate and rhythm.  No murmur appreciated.


RESPIRATORY: No accessory muscle use. Clear to auscultation. Breath sounds 

equal bilaterally. 


GASTROINTESTINAL: Abdomen soft, non-tender, nondistended. Hepatic and splenic 

margins not palpable. 


MUSCULOSKELETAL: No obvious deformities. No clubbing.  No cyanosis.  No edema. 

Positive Tinel's sign noted. Positive Phalen's sign. 


NEUROLOGICAL: Mild depression of the left nasal labial fold appears present.  

Eyebrows are symmetric with elevation.  Left hand  is weak compared to 

right.  Plantar flexion at the left ankle is weak compared to the right side.  

Hip flexion appears weak on the left lower extremity compared to the right 

side.  Median, radial and ulnar nerve distribution with diminished sensation on 

the left side.  There is marked tenderness with pressure along the volar DRUJ 

on the left side.  


PSYCHIATRIC: Appropriate mood and affect; insight and judgment normal.





Data


Data


Last Documented VS





Vital Signs








  Date Time  Temp Pulse Resp B/P Pulse Ox O2 Delivery O2 Flow Rate FiO2


 


1/17/17 22:18   18  97 Room Air  


 


1/17/17 21:38  82  146/80    


 


1/17/17 20:22 98.3       





VS reviewed


Orders





 Electrocardiogram (1/17/17 20:44)


Complete Blood Count With Diff (1/17/17 20:44)


Basic Metabolic Panel (Bmp) (1/17/17 20:44)


Drug Screen, Random Urine (1/17/17 20:44)


Ct Brain W/O Iv Contrast(Rout) (1/17/17 20:44)


Ecg Monitoring (1/17/17 20:44)


Iv Access Insert/Monitor (1/17/17 20:44)


Oxygen Administration (1/17/17 20:44)


Oximetry (1/17/17 20:44)


Sodium Chloride 0.9% Flush (Ns Flush) (1/17/17 20:45)


Acetaminophen (Tylenol) (1/17/17 20:45)


Prochlorperazine Inj (Compazine Inj) (1/17/17 20:45)


Diphenhydramine Inj (Benadryl Inj) (1/17/17 20:45)


Admit Order (Ed Use Only) (1/17/17 22:17)


Sodium Chlor 0.9% 1000 Ml Inj (Ns 1000 M (1/17/17 22:30)





Labs





 Laboratory Tests








Test 1/17/17 1/17/17





 20:50 21:10


 


White Blood Count 6.8 TH/MM3 


 


Red Blood Count 5.18 MIL/MM3 


 


Hemoglobin 14.2 GM/DL 


 


Hematocrit 42.8 % 


 


Mean Corpuscular Volume 82.8 FL 


 


Mean Corpuscular Hemoglobin 27.4 PG 


 


Mean Corpuscular Hemoglobin 33.1 % 





Concent  


 


Red Cell Distribution Width 13.8 % 


 


Platelet Count 207 TH/MM3 


 


Mean Platelet Volume 8.5 FL 


 


Neutrophils (%) (Auto) 61.8 % 


 


Lymphocytes (%) (Auto) 27.3 % 


 


Monocytes (%) (Auto) 7.0 % 


 


Eosinophils (%) (Auto) 3.2 % 


 


Basophils (%) (Auto) 0.7 % 


 


Neutrophils # (Auto) 4.2 TH/MM3 


 


Lymphocytes # (Auto) 1.9 TH/MM3 


 


Monocytes # (Auto) 0.5 TH/MM3 


 


Eosinophils # (Auto) 0.2 TH/MM3 


 


Basophils # (Auto) 0.0 TH/MM3 


 


CBC Comment DIFF FINAL  


 


Differential Comment   


 


Sodium Level 142 MEQ/L 


 


Potassium Level 3.2 MEQ/L 


 


Chloride Level 103 MEQ/L 


 


Carbon Dioxide Level 31.8 MEQ/L 


 


Anion Gap 7 MEQ/L 


 


Blood Urea Nitrogen 10 MG/DL 


 


Creatinine 1.00 MG/DL 


 


Estimat Glomerular Filtration 83 ML/MIN 





Rate  


 


Random Glucose 276 MG/DL 


 


Calcium Level 8.4 MG/DL 


 


Urine Opiates Screen  NEG 


 


Urine Barbiturates Screen  NEG 


 


Urine Amphetamines Screen  NEG 


 


Urine Benzodiazepines Screen  NEG 


 


Urine Cocaine Screen  NEG 


 


Urine Cannabinoids Screen  NEG 











[facility]


Medical Decision Making


Medical Screen Exam Complete:  Yes


Emergency Medical Condition:  Yes


Differential Diagnosis


Stroke, intracranial mass, malingering, peripheral nerve disease, carpal tunnel 

disease, electrolyte imbalance, intracranial hemorrhage, migraine, complex 

migraine


Narrative Course


CBC & BMP Diagram


1/17/17 20:50








EKG reveals a sinus rhythm of 80 axis and intervals normal 





Last 24 hours Impressions








Head CT 1/17/17 2044 Signed





Impressions: 





 Service Date/Time:  Tuesday, January 17, 2017 21:13 - CONCLUSION: Negative for 





 an acute process.    Tc Borrego MD  FACR





Presentation concerning for CVA. Admission for work up of same considered 

appropriate. Pt has been sleeping throughout ER course, however, does complain 

of headache.  The patient received Compazine, Benadryl, Tylenol and a liter of 

saline.  Presumably symptoms are secondary to stroke however symptoms have been 

present for about 3 days with normal CT; further characterization with MR 

imaging considered appropriate.  Discussed with Dr. Peterson.





Diagnosis





 Primary Impression:  


 Weakness


 Additional Impressions:  


 Numbness and tingling


 Facial droop





Admitting Information


Admitting Physician Requests:  Observation








Abdon Lamb MD Jan 17, 2017 20:29

## 2017-01-17 NOTE — RADHPO
EXAM DATE/TIME:  01/17/2017 21:13 

 

[facility] COMPARISON:     CT BRAIN W/O CONTRAST, January 16, 2017, 2:00.

 

INDICATIONS :     Left sided weakness, numbness and tingling.

                      

RADIATION DOSE:     58.89 CTDIvol (mGy) 

 

 

MEDICAL HISTORY :     Cardiovascular disease. Cerebrovascular disease.  

SURGICAL HISTORY :      None. 

ENCOUNTER:      Initial

ACUITY:      3 days

PAIN SCALE:      0/10

LOCATION:        cranial 

 

TECHNIQUE:     Multiple contiguous axial images were obtained of the head.  Using automated exposure 
control and adjustment of the mA and/or kV according to patient size, radiation dose was kept as low 
as reasonably achievable to obtain optimal diagnostic quality images. 

 

FINDINGS:     

CEREBRUM:     The ventricles are normal for age.  No evidence of midline shift, mass lesion, hemorrha
ge or acute infarction.  No extra-axial fluid collections are seen.

POSTERIOR FOSSA:     The cerebellum and brainstem are intact.  The 4th ventricle is midline.  The cer
ebellopontine angle is unremarkable.

EXTRACRANIAL:     The visualized portion of the orbits is intact.

SKULL:     The calvaria is intact.  No evidence of skull fracture.

 

CONCLUSION:     Negative for an acute process.  

 Tc Borrego MD FACR on January 17, 2017 at 21:37           

Board Certified Radiologist.

 This report was verified electronically.

## 2017-01-18 VITALS
TEMPERATURE: 96.8 F | DIASTOLIC BLOOD PRESSURE: 106 MMHG | HEART RATE: 71 BPM | SYSTOLIC BLOOD PRESSURE: 165 MMHG | OXYGEN SATURATION: 99 % | RESPIRATION RATE: 16 BRPM

## 2017-01-18 VITALS
RESPIRATION RATE: 18 BRPM | TEMPERATURE: 97.2 F | SYSTOLIC BLOOD PRESSURE: 115 MMHG | OXYGEN SATURATION: 95 % | HEART RATE: 69 BPM | DIASTOLIC BLOOD PRESSURE: 87 MMHG

## 2017-01-18 VITALS — SYSTOLIC BLOOD PRESSURE: 142 MMHG | DIASTOLIC BLOOD PRESSURE: 68 MMHG

## 2017-01-18 VITALS
RESPIRATION RATE: 20 BRPM | HEART RATE: 92 BPM | DIASTOLIC BLOOD PRESSURE: 86 MMHG | OXYGEN SATURATION: 95 % | SYSTOLIC BLOOD PRESSURE: 147 MMHG | TEMPERATURE: 97.2 F

## 2017-01-18 VITALS
OXYGEN SATURATION: 97 % | HEART RATE: 83 BPM | RESPIRATION RATE: 15 BRPM | SYSTOLIC BLOOD PRESSURE: 145 MMHG | TEMPERATURE: 96.7 F | DIASTOLIC BLOOD PRESSURE: 97 MMHG

## 2017-01-18 VITALS
TEMPERATURE: 97.5 F | SYSTOLIC BLOOD PRESSURE: 151 MMHG | OXYGEN SATURATION: 95 % | HEART RATE: 89 BPM | RESPIRATION RATE: 18 BRPM | DIASTOLIC BLOOD PRESSURE: 110 MMHG

## 2017-01-18 VITALS
OXYGEN SATURATION: 97 % | RESPIRATION RATE: 20 BRPM | HEART RATE: 76 BPM | SYSTOLIC BLOOD PRESSURE: 154 MMHG | TEMPERATURE: 97 F | DIASTOLIC BLOOD PRESSURE: 85 MMHG

## 2017-01-18 VITALS — HEART RATE: 68 BPM

## 2017-01-18 LAB
ALP SERPL-CCNC: 131 U/L (ref 45–117)
ALT SERPL-CCNC: 67 U/L (ref 12–78)
ANION GAP SERPL CALC-SCNC: 6 MEQ/L (ref 5–15)
AST SERPL-CCNC: 18 U/L (ref 15–37)
BILIRUB SERPL-MCNC: 0.3 MG/DL (ref 0.2–1)
BUN SERPL-MCNC: 9 MG/DL (ref 7–18)
CHLORIDE SERPL-SCNC: 105 MEQ/L (ref 98–107)
CK SERPL-CCNC: 40 U/L (ref 39–308)
CK SERPL-CCNC: 40 U/L (ref 39–308)
CK SERPL-CCNC: 46 U/L (ref 39–308)
GFR SERPLBLD BASED ON 1.73 SQ M-ARVRAT: 131 ML/MIN (ref 89–?)
HCO3 BLD-SCNC: 30.7 MEQ/L (ref 21–32)
HDLC SERPL-MCNC: 28.1 MG/DL (ref 40–60)
HEMOGLOBIN A1A: 1.2 %
HEMOGLOBIN A1B: 2.1 %
HEMOGLOBIN AO: 81.5 %
HEMOGLOBIN LA1C: 2.3 %
HEMOGLOBIN P3: 4.4 %
LDLC SERPL-MCNC: 80 MG/DL (ref 0–99)
POTASSIUM SERPL-SCNC: 3.3 MEQ/L (ref 3.5–5.1)
SODIUM SERPL-SCNC: 142 MEQ/L (ref 136–145)
VIT B12 SERPL-MCNC: 671 PG/ML (ref 193–986)

## 2017-01-18 RX ADMIN — SODIUM CHLORIDE, PRESERVATIVE FREE SCH ML: 5 INJECTION INTRAVENOUS at 08:41

## 2017-01-18 RX ADMIN — ASPIRIN SCH MG: 81 TABLET ORAL at 08:40

## 2017-01-18 RX ADMIN — INSULIN ASPART SCH: 100 INJECTION, SOLUTION INTRAVENOUS; SUBCUTANEOUS at 20:06

## 2017-01-18 RX ADMIN — SODIUM CHLORIDE, PRESERVATIVE FREE SCH ML: 5 INJECTION INTRAVENOUS at 20:03

## 2017-01-18 RX ADMIN — ATORVASTATIN CALCIUM SCH MG: 20 TABLET, FILM COATED ORAL at 08:39

## 2017-01-18 RX ADMIN — INSULIN ASPART SCH: 100 INJECTION, SOLUTION INTRAVENOUS; SUBCUTANEOUS at 11:20

## 2017-01-18 RX ADMIN — INSULIN ASPART SCH: 100 INJECTION, SOLUTION INTRAVENOUS; SUBCUTANEOUS at 16:00

## 2017-01-18 RX ADMIN — INSULIN ASPART SCH: 100 INJECTION, SOLUTION INTRAVENOUS; SUBCUTANEOUS at 07:00

## 2017-01-18 NOTE — RADHPO
EXAM DATE/TIME:  01/18/2017 14:32 

 

[facility] COMPARISON:     

No previous studies available for comparison.

       

 

 

INDICATIONS :     

Left sided weakness.

                     

 

MEDICAL HISTORY :     

Hypertension.     

 

SURGICAL HISTORY :           

Right knee.

 

ENCOUNTER:     

Initial

 

ACUITY:     

2 day

 

PAIN SCORE:     

4/10

 

LOCATION:         

head

 

Please note a normal MRA of the brain does not entirely exclude the possibility of a small aneurysm, 


nor the possibility of distal intracranial vessel disease.

 

TECHNIQUE:     

3D time of flight MRA was performed.  Source images, multiplanar STS MIP, and 3D volume MIP reconstru
ctions were reviewed.

 

FINDINGS:     

There is excellent visualization of the major intracranial arteries out to the second-order branch ve
ssels.  There is no evidence for aneurysm, vessel truncation or stenosis, and no evidence for vascula
r malformation. There are patent posterior communicating arteries bilaterally with a small vertebral 
basilar circulation.

 

CONCLUSION:     

1. Unremarkable MR angiography of the brain.

 

 

 

 Joshua Bertrand MD on January 18, 2017 at 15:38           

Board Certified Radiologist.

 This report was verified electronically.

## 2017-01-18 NOTE — RADHPO
EXAM DATE/TIME:  01/18/2017 14:32 

 

[facility] COMPARISON:     No previous studies available for comparison.

       

 

INDICATIONS :     Weakness.

                     

CONTRAST:     20 cc Omniscan (gadodiamide) IV

                     

MEDICAL HISTORY :     Hypertension.     

SURGICAL HISTORY :           Right knee.

ENCOUNTER:     Initial

ACUITY:     2 day

PAIN SCORE:     0/10

LOCATION:         neck

 

Percent stenosis is calculated using the diameter of the stenotic region over the diameter of the nor
mal distal 

internal carotid artery.

 

TECHNIQUE:     Bolus infused MRA of the extracranial circulation was performed using a neurovascular 
coil.  Post processing was performed including rotationg subvolume maximum intensity projections of e
ach carotid artery, rotating full volume maximum intensity projections of both carotid arteries, sagi
ttal and coronal sliding thin slab reformations of each carotid artery, and left oblique sliding thin
 slab reformation through the aortic arch to include the origin of the arch branch vessels.

 

FINDINGS:     

AORTIC ARCH:     There is a three vessel origin of the great vessels from the aorta.  No evidence of 
ostial narrowing.

RIGHT CAROTID:     The common carotid artery is intact.  The carotid bulb has a normal configuration 
without ulceration or narrowing.  The internal carotid artery lumen is smooth without stenosis.  The 
external carotid artery is intact.

LEFT CAROTID:     The common carotid artery is intact.  The carotid bulb has a normal configuration w
ithout ulceration or narrowing.  The internal carotid artery lumen is smooth without stenosis.  The e
xternal carotid artery is intact.

VERTEBRALS:     The vertebral arteries have a symmetric diameter.  No stenotic lesions are seen.

 

CONCLUSION:     

Unremarkable MR angiography of the carotid arteries 

 

 Joshua Bertrand MD on January 18, 2017 at 15:47           

Board Certified Radiologist.

 This report was verified electronically.

## 2017-01-18 NOTE — HHI.HP
__________________________________________________





[facility]


[facility]


[facility]


Primary Care Physician


No Primary Care Physician


Admission Diagnosis


L Face/LUE/LLE Weakness, Cephalgia, HypoK, Hyperglycemia


Diagnoses:  


(1) Numbness and tingling


Diagnosis:  Principal





(2) Weakness


Diagnosis:  Principal





(3) Facial droop


Diagnosis:  Principal





(4) Unilateral headache


Diagnosis:  Principal





(5) Hyperglycemia


Diagnosis:  Principal





(6) Chest pain


Diagnosis:  Principal





(7) Hypertension


Diagnosis:  Secondary





(8) History of CVA (cerebrovascular accident)


Diagnosis:  Secondary





Chief Complaint:  


Left-sided headache, left arm and leg weakness, left facial pain, chest


pain


Travel History


International Travel<30 Days:  No


Contact w/Intl Traveler <30 Da:  No


Traveled to Known Affected Are:  No


History of Present Illness


41 year-old  male with known history of hypertension, TIA who 

presented to the hospital because of multiple symptoms to include left facial 

pain, left upper and lower extremity numbness and weakness, chest pain.  

Patient indicates that this discomfort has been going on for quite some time, 

he cannot give detail how long.  He states that over the last 3 days he is had 

a pain in the left side of his face which he describes as a sharp pain like a 

weight is on the left side of his face.  It has been 10/10 on a pain scale for 

3 days.  He did present to emergency department 2 days ago and was recommended 

that he would be admitted for CVA workup.  However patient left AGAINST MEDICAL 

ADVICE because he had workup obligations.  Patient indicates the discomfort and 

symptoms did not go away and persisted so he came back to the hospital.  He 

does indicate that he has a pain on the left side of his face.  He states that 

he has a left-sided facial droop.  Patient has numbness and tingling in the 

left arm and leg.  He indicates that he is lost strength in his left upper 

extremity.  He denies any visual disturbances, difficulty in eating, difficulty 

in speaking, gait disturbance.  Patient still complaining of a 10/10 left-sided 

facial pain.  He is requesting pain medications.  Patient states that he also 

has chest pain which has been rather persistent.  States pain is located center 

part of his chest radiating into his back.  He believes that he has had 

associated diaphoresis.  Patient indicates that is positional.  He states that 

whenever he is lying on his back he develops chest pain, however when he rolls 

over onto his left side the pain goes away.  Denies any nausea, vomiting, 

shortness of breath, dyspnea.  Patient was seen in 2015 by myself and underwent 

chest pain center workup with exercise stress test which was normal.  Patient 

accepted hospitalization this visit for further workup.





Review of Systems


Constitutional:  COMPLAINS OF: Diaphoretic episodes,  DENIES: Fatigue, Fever, 

Weight gain, Weight loss, Chills, Dizziness, Change in appetite, Night Sweats


Eyes:  DENIES: Blurred vision, Diplopia, Eye inflammation, Eye pain, Vision loss

, Double Vision


Ears, nose, mouth, throat:  DENIES: Vertigo, Nasal discharge, Throat pain, Ear 

Pain, Running Nose, Sinus Pain


Respiratory:  DENIES: Apneas, Cough, Snoring, Wheezing, Hemoptysis, Sputum 

production, Shortness of breath


Cardiovascular:  COMPLAINS OF: Chest pain,  DENIES: Palpitations, Syncope, 

Dyspnea on Exertion, Lower Extremity Edema, Orthopnea


Gastrointestinal:  DENIES: Abdominal pain, Black stools, Bloody stools, 

Constipation, Diarrhea, Nausea, Vomiting, Difficulty Swallowing, Anorexia


Neurologic:  COMPLAINS OF: Headache, Localized weakness,  DENIES: Abnormal gait

, Paresthesias, Seizures, Speech Problems, Tremor, Poor Balance


Psychiatric:  DENIES: Anxiety, Confusion, Mood changes, Depression





Past Family Social History


Past Medical History


Hypertension


History of CVA


Unknown heart valvular disorder


Past Surgical History


Right knee replacement


Reported Medications





Reported Meds & Active Scripts


Active


Percocet (Oxycodone-Acetaminophen) 5-325 mg Tab 1 Tab PO Q4H PRN


Norvasc (Amlodipine Besylate) 10 Mg Tab 10 Mg PO DAILY


Reported


Vitamin C (Ascorbic Acid) 100 Mg Chew 100 Mg CHEW DAILY


Allergies:  


Coded Allergies:  


     Penicillin (Verified  Allergy, Severe, Anaphylaxis, 1/17/17)


     Sulfa (Verified  Allergy, Severe, Anaphylaxis, 1/17/17)


Family History


Reviewed is significant for father side of the family had heart disease


Social History


Patient smokes 2 cigars daily.  Patient quit smoking cigarettes in October 2015.  Prior to that he smoked up to 1-1/2 pack of cigarettes a day since he 

was 14 years old.  Patient denies any current illicit drug use.  Patient does 

drink alcohol socially





Physical Exam


Vital Signs





 Vital Signs








  Date Time  Temp Pulse Resp B/P Pulse Ox O2 Delivery O2 Flow Rate FiO2


 


1/18/17 04:00 97.2 69 18 115/87 95   


 


1/18/17 00:02  72 18 142/68 97   


 


1/18/17 00:00 97.0 76 20 154/85 97   


 


1/18/17 00:00 97.0 76 20 154/85 97   


 


1/17/17 22:29  78 18 149/67 97 Room Air  


 


1/17/17 22:19   18     


 


1/17/17 22:18   18  97 Room Air  


 


1/17/17 21:38  82 18 146/80 98 Room Air  


 


1/17/17 21:04   18  96 Room Air  


 


1/17/17 21:04     96 Room Air  


 


1/17/17 20:32   18  96 Room Air  


 


1/17/17 20:22 98.3 86 18 136/82 96   








Physical Exam


GENERAL: Well-developed, well-nourished, in no acute distress. alert and 

orientated


HEENT: Head is normocephalic without any lesions or masses noted.  Appears if 

patient has a right facial droop. Eyes: Pupils equal round reactive to light. 

Extraocular muscles are intact. Conjunctivae were clear. Oropharyngeal: Pharynx 

without any erythema edema. Tongue is midline without deviation. Buccal mucosa 

is moist without any masses or lesions


NECK: Supple without any masses. Trachea midline no deviation. No JVD, no 

bruits are appreciated


CARDIAC: Regular rhythm, regular rate. S1/S2 are heard. No murmurs gallops or 

rubs.


LUNGS: Clear to auscultation bilaterally. No wheeze, rhonchi or rales. No use 

of accessory muscles on inspiration or expiration.


ABDOMEN: Soft, nontender. Nondistended. Bowel sounds heard in all 4 quadrants. 

No organomegaly or masses. Negative rebound, negative guarding


EXTREMITIES: No edema, pulses are equal bilaterally. No cyanosis or clubbing


NEUROLOGY: Mood and affect appear appropriate. Cranial nerves II through XII 

grossly intact.  Bilateral lower extremity strength 5/5, patient has bilateral 

upper extremity strength 5/5 on extension and contraction of upper extremities.

   strength is 3/5 in the left upper extremity. Deep tendon reflexes are 2+ 

in upper and lower extremities bilaterally.


Laboratory





Laboratory Tests








Test 1/17/17 1/17/17





 20:50 21:10


 


White Blood Count 6.8  


 


Red Blood Count 5.18  


 


Hemoglobin 14.2  


 


Hematocrit 42.8  


 


Mean Corpuscular Volume 82.8  


 


Mean Corpuscular Hemoglobin 27.4  


 


Mean Corpuscular Hemoglobin 33.1  





Concent  


 


Red Cell Distribution Width 13.8  


 


Platelet Count 207  


 


Mean Platelet Volume 8.5  


 


Neutrophils (%) (Auto) 61.8  


 


Lymphocytes (%) (Auto) 27.3  


 


Monocytes (%) (Auto) 7.0  


 


Eosinophils (%) (Auto) 3.2  


 


Basophils (%) (Auto) 0.7  


 


Neutrophils # (Auto) 4.2  


 


Lymphocytes # (Auto) 1.9  


 


Monocytes # (Auto) 0.5  


 


Eosinophils # (Auto) 0.2  


 


Basophils # (Auto) 0.0  


 


CBC Comment DIFF FINAL  


 


Differential Comment   


 


Sodium Level 142  


 


Potassium Level 3.2  


 


Chloride Level 103  


 


Carbon Dioxide Level 31.8  


 


Anion Gap 7  


 


Blood Urea Nitrogen 10  


 


Creatinine 1.00  


 


Estimat Glomerular Filtration 83  





Rate  


 


Random Glucose 276  


 


Calcium Level 8.4  


 


Urine Opiates Screen  NEG 


 


Urine Barbiturates Screen  NEG 


 


Urine Amphetamines Screen  NEG 


 


Urine Benzodiazepines Screen  NEG 


 


Urine Cocaine Screen  NEG 


 


Urine Cannabinoids Screen  NEG 








Result Diagram:  


1/17/17 2050 1/17/17 2050





Imaging





Last Impressions








Head CT 1/17/17 2044 Signed





Impressions: 





 Service Date/Time:  Tuesday, January 17, 2017 21:13 - CONCLUSION: Negative for 





 an acute process.    Tc Borrego MD  FACR











Assessment and Plan


Assessment and Plan


Left facial pain, left upper and lower extremity weakness, paresthesia.  Rule 

out TIA/CVA


CT scan is negative for acute etiology, unlikely CVA since symptoms have been 

ongoing for over 3 days


Obtain MRI/MRA of the brain


Obtain carotid ultrasound


Obtain echocardiogram


Obtain further laboratory studies to include sedimentation rate, B12, folate, 

TSH, lipid panel


Patient was given Plavix in the emergency department, further anticoagulation 

per neurology


Permissive hypertension


OT/ST/PT evaluations


Neurology consultation has been requested





Hyperlipidemia, mixed


Start Lipitor 20 mg daily





Chest pain, atypical


Obtain serial cardiac enzymes rule out any acute coronary event


Obtain serial EKGs to rule out any changes


Start aspirin, nitroglycerin as needed


Patient has had exercise stress test November, 2015 which was normal and no 

signs of ischemia





Hypokalemia


Replace and continue to monitor





Hypertension


Permissive hypertension at this time


Start patient's Norvasc once cleared by neurology





Hyperglycemia/diabetes


Patient has had hyperglycemia dating back to 2015, highest is 276.  


We will obtain hemoglobin A1c


Consult diabetic educator, dietitian for diabetic diet education





Hypokalemia


Replace and continue monitor





DVT prevention


Sequential compression devices





Written by Esau Hong PA-C, acting as scribe for Dr. Santillan on 1/18/17 

at 1105.  


The documentation accurately reflects the work and decisions performed face-to-

face by Dr. Santillan on 1/18/17 at 1105.





Problem Qualifiers





(1) Chest pain:  


Qualified Code:  R07.9 - Chest pain, unspecified type


(2) Hypertension:  


Qualified Code:  I15.9 - Secondary hypertension





Esau Hong Jan 18, 2017 07:14

## 2017-01-18 NOTE — EKG
Date Performed: 01/17/2017       Time Performed: 20:56:10

 

PTAGE:      41 years

 

EKG:      Sinus rhythm 

 

 Inferior ST-T changes are nonspecific Borderline ECG

 

PREVIOUS TRACING       : 01/16/2017 01.30 Compared to prior tracing no significant change

 

DOCTOR:   Mari Collazo  Interpretating Date/Time  01/18/2017 20:09:58

## 2017-01-18 NOTE — RADHPO
EXAM DATE/TIME:  01/18/2017 14:32 

 

[facility] COMPARISON:     No previous studies available for comparison.

       

 

INDICATIONS :     ***Left sided weakness.

                     

CONTRAST:     20 cc Omniscan (gadodiamide) IV

                     

MEDICAL HISTORY :     Hypertension.     

SURGICAL HISTORY :     Right knee.

ENCOUNTER:     Initial

ACUITY:     2 day

PAIN SCORE:     3/10

LOCATION:     Head

 

TECHNIQUE:     Multiplanar, multisequence MRI of the brain was performed both prior to and following 
the administration of paramagnetic contrast.

 

FINDINGS:     There is no restricted diffusion evident. Ventricles are of normal size.  There are charles
y minimal scattered periventricular white matter changes evident.  

 

Midline structures are intact. There are no extra-axial fluid collections appreciated.  

 

Following intravenous administration of gadolinium there is no abnormal contrast enhancement.  

 

CONCLUSION:     

1. Negative MRI of the brain for an acute ischemic event.  

2. I do not see etiology for the patient's left-sided weakness and numbness.  There are minimal periv
entricular white matter changes in the posterior fossa including the brainstem but these are nonspeci
fic.  

 

 

 Tc Borrego MD FACR on January 18, 2017 at 15:37                

Board Certified Radiologist.

 This report was verified electronically.

## 2017-01-18 NOTE — EC
Study

 

Study Date:01/18/2017

 

 

 

STUDY CONCLUSIONS

 

SUMMARY

 

- Left ventricle: The cavity size was normal. Wall thickness was

normal. Systolic function was normal. The estimated ejection

fraction was in the range of 50% to 55%.

- Aortic valve: Valve area: 1.84cm^2 (Vmax).

 

-------------------------------------------------------------------

If LV function is below 40, please consider prescribing an ACEI or

ARB or document rationale for non-use.

 

-------------------------------------------------------------------

PROCEDURE DATA

 

STUDY STATUS:

Elective. Procedure: Transthoracic echocardiography.

Image quality was suboptimal. Scanning was performed from the

parasternal, apical, and subcostal acoustic windows. Study

completion: The patient tolerated the procedure well.

Transthoracic echocardiography. M-mode, complete 2D, complete

spectral Doppler, and color Doppler. Patient status: Inpatient.

 

-------------------------------------------------------------------

CARDIAC ANATOMY

 

LEFT VENTRICLE:

The cavity size was normal. Wall thickness was

normal. Systolic function was normal. The estimated ejection

fraction was in the range of 50% to 55%. Images were inadequate for

LV wall motion assessment.

 

AORTIC VALVE:

Trileaflet; normal thickness leaflets. Doppler:

Transvalvular velocity was within the normal range. There was no

stenosis. No regurgitation.  Valve area: 1.84cm^2 (Vmax).  Mean

gradient: 4mm Hg (S).

 

AORTA:

Aortic root: The aortic root was normal in size.

 

MITRAL VALVE:

Structurally normal valve. Doppler: Transvalvular

velocity was within the normal range. There was no evidence for

stenosis. No regurgitation.  Peak gradient: 5mm Hg (D).

 

LEFT ATRIUM:

The atrium was normal in size.

 

RIGHT VENTRICLE:

The cavity size was normal. Wall thickness was

normal.

 

PULMONIC VALVE:

Doppler: Transvalvular velocity was within the

normal range. There was no evidence for stenosis. No regurgitation.

 

TRICUSPID VALVE:

Structurally normal valve. Doppler: Transvalvular

velocity was within the normal range. No regurgitation.

 

PULMONARY ARTERY:

Not well visualized. Systolic pressure was

within the normal range.

 

RIGHT ATRIUM:

The atrium was normal in size.

 

PERICARDIUM:

There was no pericardial effusion.

 

SYSTEMIC VEINS:

Inferior vena cava: Not visualized.

 

-------------------------------------------------------------------

 

BASIC MEASUREMENTS                                     ADULT NORMAL

Left ventricle

LV internal dimension, ED, chordal level,  *41.7 mm    43-52

PLAX

LV internal dimension, ES, chordal level,   31.3 mm    23-38

PLAX

Fractional shortening, chordal level, PLAX   *25 %     >29

LV posterior wall thickness, ED              8.5 mm    ------------

IVS/LVPW ratio, ED                          1.27       <1.3

Ventricular septum

Septal thickness, ED                        10.8 mm    ------------

Aortic valve

Leaflet separation                            18 mm    15-26

 

BASIC MEASUREMENTS                                     ADULT NORMAL

Aortic valve

Leaflet separation                            18 mm    15-26

Aorta

Root diameter, ED                             24 mm    20-37

Left atrium

Anterior-posterior dimension, ES              35 mm    19-40

LA/aortic root ratio                        1.46       ------------

 

DOPPLER MEASUREMENTS                                   ADULT NORMAL

Aortic valve

Peak velocity, S                             139 cm/s  ------------

Mean velocity, S                            96.7 cm/s  ------------

VTI, S                                      23.8 cm    ------------

Mean gradient, S                               4 mm Hg ------------

Valve area, Vmax                            1.84 cm^2  ------------

Mitral valve

Peak E-wave velocity                         107 cm/s  ------------

Peak A-wave velocity                        81.4 cm/s  ------------

Deceleration time                           *116 ms    150-230

Peak gradient, D                               5 mm Hg ------------

Peak E/A ratio                               1.3       ------------

Pulmonic valve

Peak velocity, S                             106 cm/s  ------------

 

LEGEND:

Mean values are shown as u=mean value.

Asterisk (*) marks values outside specified normal range.

Prepared and signed by

 

Nathaniel Fernandez

[id number]

## 2017-01-19 VITALS
OXYGEN SATURATION: 96 % | SYSTOLIC BLOOD PRESSURE: 158 MMHG | TEMPERATURE: 97.7 F | HEART RATE: 95 BPM | RESPIRATION RATE: 20 BRPM | DIASTOLIC BLOOD PRESSURE: 99 MMHG

## 2017-01-19 VITALS
DIASTOLIC BLOOD PRESSURE: 100 MMHG | HEART RATE: 99 BPM | RESPIRATION RATE: 17 BRPM | SYSTOLIC BLOOD PRESSURE: 162 MMHG | OXYGEN SATURATION: 94 % | TEMPERATURE: 97.8 F

## 2017-01-19 VITALS
SYSTOLIC BLOOD PRESSURE: 154 MMHG | HEART RATE: 92 BPM | DIASTOLIC BLOOD PRESSURE: 100 MMHG | TEMPERATURE: 99.2 F | OXYGEN SATURATION: 96 % | RESPIRATION RATE: 18 BRPM

## 2017-01-19 VITALS
OXYGEN SATURATION: 95 % | RESPIRATION RATE: 18 BRPM | DIASTOLIC BLOOD PRESSURE: 91 MMHG | TEMPERATURE: 97.7 F | SYSTOLIC BLOOD PRESSURE: 141 MMHG | HEART RATE: 83 BPM

## 2017-01-19 VITALS
RESPIRATION RATE: 18 BRPM | TEMPERATURE: 97.3 F | HEART RATE: 92 BPM | OXYGEN SATURATION: 92 % | DIASTOLIC BLOOD PRESSURE: 107 MMHG | SYSTOLIC BLOOD PRESSURE: 156 MMHG

## 2017-01-19 VITALS
SYSTOLIC BLOOD PRESSURE: 135 MMHG | OXYGEN SATURATION: 96 % | DIASTOLIC BLOOD PRESSURE: 100 MMHG | HEART RATE: 91 BPM | RESPIRATION RATE: 18 BRPM | TEMPERATURE: 97.1 F

## 2017-01-19 VITALS — HEART RATE: 72 BPM

## 2017-01-19 LAB — RAPID PLASMA REAGIN SCREEN: (no result)

## 2017-01-19 RX ADMIN — INSULIN ASPART SCH: 100 INJECTION, SOLUTION INTRAVENOUS; SUBCUTANEOUS at 06:34

## 2017-01-19 RX ADMIN — LISINOPRIL SCH MG: 10 TABLET ORAL at 18:04

## 2017-01-19 RX ADMIN — PANTOPRAZOLE SCH MG: 40 TABLET, DELAYED RELEASE ORAL at 18:04

## 2017-01-19 RX ADMIN — ATORVASTATIN CALCIUM SCH MG: 20 TABLET, FILM COATED ORAL at 09:56

## 2017-01-19 RX ADMIN — INSULIN ASPART SCH: 100 INJECTION, SOLUTION INTRAVENOUS; SUBCUTANEOUS at 20:18

## 2017-01-19 RX ADMIN — ASPIRIN SCH MG: 81 TABLET ORAL at 09:56

## 2017-01-19 RX ADMIN — SODIUM CHLORIDE, PRESERVATIVE FREE SCH ML: 5 INJECTION INTRAVENOUS at 09:57

## 2017-01-19 RX ADMIN — INSULIN ASPART SCH: 100 INJECTION, SOLUTION INTRAVENOUS; SUBCUTANEOUS at 11:07

## 2017-01-19 RX ADMIN — INSULIN ASPART SCH: 100 INJECTION, SOLUTION INTRAVENOUS; SUBCUTANEOUS at 16:25

## 2017-01-19 RX ADMIN — SODIUM CHLORIDE, PRESERVATIVE FREE SCH ML: 5 INJECTION INTRAVENOUS at 20:14

## 2017-01-19 NOTE — EKG
Date Performed: 01/18/2017       Time Performed: 18:51:10

 

PTAGE:      41 years

 

EKG:      Sinus rhythm 

 

 Inferior ST-T changes are nonspecific Borderline ECG

 

PREVIOUS TRACING       : 01/18/2017 12.39

 

DOCTOR:   Stephen Guzman  Interpretating Date/Time  01/19/2017 21:04:25

## 2017-01-19 NOTE — RADHPO
EXAM DATE/TIME:  01/19/2017 11:55 

 

COMPARISON:     

No previous studies available for comparison.

 

       

 

INDICATIONS :     ***Cephalgia. Left sided weakness.

                     

CONTRAST:     20 cc Omniscan (gadodiamide) IV

                     

MEDICAL HISTORY :     Hypertension. Myocardial infarction.   TIA.

SURGICAL HISTORY :           Cardiac cath and right knee.

ENCOUNTER:     Initial

ACUITY:     2 day

PAIN SCORE:     4/10

LOCATION:         Head.

 

FINDINGS:               

Examination demonstrates no evidence of venous sinus thrombosis. The superior sagittal sinus, transve
rse sinuses and sigmoid sinuses are patent bilaterally. The internal cerebral veins and straight sinu
s are normal.

 

CONCLUSION:     Unremarkable MR venography of the brain.

 

 Joshua Bertrand MD on January 19, 2017 at 14:48           

Board Certified Radiologist.

 This report was verified electronically.

## 2017-01-19 NOTE — EKG
Date Performed: 01/18/2017       Time Performed: 12:39:34

 

PTAGE:      41 years

 

EKG:      Sinus rhythm 

 

. Inferior T wave changes are nonspecific Borderline ECG

 

PREVIOUS TRACING       : 01/18/2017 07.21

 

DOCTOR:   Stephen Guzman  Interpretating Date/Time  01/19/2017 21:11:09

## 2017-01-19 NOTE — HHI.PR
Subjective


Remarks


still ha i dw med team and nurse





Objective





 Vital Signs








  Date Time  Temp Pulse Resp B/P Pulse Ox O2 Delivery O2 Flow Rate FiO2


 


1/19/17 16:00 97.1 91 18 135/100 96   


 


1/19/17 12:00 97.7 95 20 158/99 96   


 


1/19/17 10:55  72      


 


1/19/17 08:00 97.8 99 17 162/100 94   


 


1/19/17 04:00 97.7 83 18 141/91 95   


 


1/19/17 00:00 99.2 92 18 154/100 96   


 


1/18/17 20:00  89      


 


1/18/17 20:00 97.5 83 18 151/110 95   


 


1/18/17 17:30 96.7 83 15 145/97 97   








 I/O








 1/18/17 1/18/17 1/18/17 1/19/17 1/19/17 1/19/17





 07:00 15:00 23:00 07:00 15:00 23:00


 


Intake Total 1000 ml  1000 ml  650 ml 


 


Output Total 1400 ml     


 


Balance -400 ml  1000 ml  650 ml 


 


      


 


Intake Oral 0 ml  1000 ml  650 ml 


 


IV Total 1000 ml     


 


Output Urine Total 1400 ml     


 


# Voids   4   


 


# Bowel Movements 0     








Result Diagram:  


1/17/17 2050                                                                   

             1/18/17 1240








Assessment and Plan


Assessment and Plan


mri /a/a v and c spine mri neg


labs neg





try decadron and elavil for ha





will reeval in am





there was small left cbllr abn on mri brain recheck with better image








Joshua Garcia MD Jan 19, 2017 17:08

## 2017-01-19 NOTE — HHI.PR
Subjective


Remarks


Patient seen and examined today with Dr. Santillan.  Patient states he still 

having left-sided headache, arm pain, leg pain.  Workup has been essentially 

unremarkable.  Discussed with neurologist.





Objective


Vitals





 Vital Signs








  Date Time  Temp Pulse Resp B/P Pulse Ox O2 Delivery O2 Flow Rate FiO2


 


1/19/17 16:00 97.1 91 18 135/100 96   


 


1/19/17 12:00 97.7 95 20 158/99 96   


 


1/19/17 10:55  72      


 


1/19/17 08:00 97.8 99 17 162/100 94   


 


1/19/17 04:00 97.7 83 18 141/91 95   


 


1/19/17 00:00 99.2 92 18 154/100 96   


 


1/18/17 20:00  89      


 


1/18/17 20:00 97.5 83 18 151/110 95   


 


1/18/17 17:30 96.7 83 15 145/97 97   








 I/O








 1/18/17 1/18/17 1/18/17 1/19/17 1/19/17 1/19/17





 07:00 15:00 23:00 07:00 15:00 23:00


 


Intake Total 1000 ml  1000 ml  650 ml 


 


Output Total 1400 ml     


 


Balance -400 ml  1000 ml  650 ml 


 


      


 


Intake Oral 0 ml  1000 ml  650 ml 


 


IV Total 1000 ml     


 


Output Urine Total 1400 ml     


 


# Voids   4   


 


# Bowel Movements 0     








Result Diagram:  


1/17/17 2050                                                                   

             1/18/17 1240





Objective Remarks


GENERAL: Well-developed, well-nourished, in no acute distress. alert and 

orientated


HEENT: Head is normocephalic without any lesions or masses noted. Facial 

features are symmetric. Eyes: Extraocular muscles are intact. Conjunctivae were 

clear.


NECK: Supple without any masses. Trachea midline no deviation. No JVD,


CARDIAC: Regular rhythm, regular rate. S1/S2 are heard. No murmurs gallops or 

rubs.


LUNGS: Clear to auscultation bilaterally. No wheeze, rhonchi or rales. No use 

of accessory muscles on inspiration or expiration.


ABDOMEN: Soft, nontender. Nondistended. Bowel sounds heard in all 4 quadrants. 

No organomegaly or masses. Negative rebound, negative guarding


EXTREMITIES: No edema, pulses are equal bilaterally. No cyanosis or clubbing


NEUROLOGY: Mood and affect appear appropriate. Cranial nerves II through XII 

grossly intact.  Moving all extremities, speech is clear


Urinary Catheter:  No


Vascular Central Line Catheter:  No





A/P


Assessment and Plan


Left facial pain, left upper and lower extremity weakness, paresthesia.  Ruled 

out TIA/CVA, unknown etiology


CT scan is negative for acute etiology, unlikely CVA since symptoms have been 

ongoing for over 3 days


MRI/MRA of the brain, unremarkable


MRA neck: Unremarkable


MRV of the brain unremarkable


Cervical spine MRI showed broad based bulging at C5-C6, however no 

encroachment on neural foramina


echocardiogram with normal systolic function, ejection fraction 50-55%


Further laboratory studies to include sedimentation rate, B12, folate, TSH, 

RPR were normal


Patient on aspirin 162 mg daily


OT/ST/PT evaluations: Were unremarkable


Neurology following the patient, discussed with him and recommended starting 

of amitriptyline, discontinued Topamax, given Decadron 10 mg IV 1 today





Hyperlipidemia, mixed


Continue Lipitor 20 mg daily





Chest pain, atypical


Serial cardiac enzymes were performed and did not indicate any acute coronary 

event


Serial EKGs did not indicate any changes


Start aspirin, nitroglycerin as needed


Patient has had exercise stress test November, 2015 which was normal and no 

signs of ischemia





Hypokalemia


Replace and continue to monitor





Hypertension


Resumed Norvasc 10 mg daily


Start lisinopril 10 mg daily


Clonidine as needed





Hyperglycemia/diabetes


Patient has had hyperglycemia dating back to 2015, highest is 276.  


Hemoglobin A1c 7.8


Patient underwent diabetic education, dietary education


Accu-Cheks with sliding scale insulin: 8 units of insulin given in the last 24 

hours





Hypokalemia


Replace and continue monitor





DVT prevention


Sequential compression devices





Written by Esau Hong PA-C, acting as scribe for Dr. Santillan on 1/19/17 

at 1755.  


The documentation accurately reflects the work and decisions performed face-to-

face by Dr. Santillan on 1/19/17 at 1755.








Esau Hong Jan 19, 2017 17:13

## 2017-01-19 NOTE — EKG
Date Performed: 01/18/2017       Time Performed: 07:21:10

 

PTAGE:      41 years

 

EKG:      Sinus rhythm 

 

. Inferior T wave changes are nonspecific Borderline ECG

 

PREVIOUS TRACING       : 01/17/2017 20.56

 

DOCTOR:   Stephen Guzman  Interpretating Date/Time  01/19/2017 21:15:18

## 2017-01-19 NOTE — RADHPO
EXAM DATE/TIME:  01/19/2017 11:55 

 

[facility] COMPARISON:     

No previous studies available for comparison.

       

 

 

INDICATIONS :     

***Myelopathy. Cephalgia with left sided weakness.

                     

 

MEDICAL HISTORY :     

Hypertension. Diabetes mellitus type 2. Myocardial infarction. TIA

 

SURGICAL HISTORY :           

Cardiac cath and right knee surgery.

 

ENCOUNTER:     

Subsequent

 

ACUITY:     

2 day

 

PAIN SCORE:     

4/10

 

LOCATION:         

neck.

 

TECHNIQUE:     

Multiplanar, multisequence MRI examination of the cervical spine was performed. Some of the images ar
e degraded due to motion artifact. This limits the diagnostic quality of the examination.

 

FINDINGS:     

 

VERTEBRAE:     

Normal vertebral body height.  Homogeneous marrow signal.

 

ALIGNMENT:     

No evidence of subluxation.

 

CORD:     

Limited visualization of the spinal cord. However no gross abnormalities demonstrated.

 

POST FOSSA:     

The cerebellar tonsils are normal in position.

 

C2-C3:  

The thecal sac has a normal configuration.  There is no evidence of disc herniation or spinal canal s
tenosis.  The neural foramina are patent bilaterally.

 

C3-C4:  

The thecal sac has a normal configuration.  There is no evidence of disc herniation or spinal canal s
tenosis.  The neural foramina are patent bilaterally.

 

C4-C5:  

The thecal sac has a normal configuration.  There is no evidence of disc herniation or spinal canal s
tenosis.  The neural foramina are patent bilaterally.

 

C5-C6: 

There appears to be some broad-based bulging. The neural foramina are patent bilaterally.

 

C6-C7:  

The thecal sac has a normal configuration.  There is no evidence of disc herniation or spinal canal s
tenosis.  The neural foramina are patent bilaterally.

 

C7-T1:  

The thecal sac has a normal configuration.  There is no evidence of disc herniation or spinal canal s
tenosis.  The neural foramina are patent bilaterally.

 

There is a 1.8 cm cyst just below the dermis along the posterior left back. This is most likely a dayna
mal cyst.

 

CONCLUSION:     

1. Limited examination due to motion artifact on most of the images.

2. Broad-based bulging at C5-6.

3. 1.8 cm probable dermal cyst posterior upper left back.

 

 

 

 Garcia Gan MD on January 19, 2017 at 14:44           

Board Certified Radiologist.

 This report was verified electronically.

## 2017-01-20 VITALS
RESPIRATION RATE: 20 BRPM | HEART RATE: 70 BPM | OXYGEN SATURATION: 92 % | TEMPERATURE: 96.2 F | DIASTOLIC BLOOD PRESSURE: 88 MMHG | SYSTOLIC BLOOD PRESSURE: 123 MMHG

## 2017-01-20 VITALS
DIASTOLIC BLOOD PRESSURE: 81 MMHG | TEMPERATURE: 96.9 F | OXYGEN SATURATION: 93 % | HEART RATE: 85 BPM | SYSTOLIC BLOOD PRESSURE: 121 MMHG | RESPIRATION RATE: 18 BRPM

## 2017-01-20 LAB — ANA SER QL: (no result)

## 2017-01-20 RX ADMIN — INSULIN ASPART SCH: 100 INJECTION, SOLUTION INTRAVENOUS; SUBCUTANEOUS at 11:26

## 2017-01-20 RX ADMIN — SODIUM CHLORIDE, PRESERVATIVE FREE SCH ML: 5 INJECTION INTRAVENOUS at 08:33

## 2017-01-20 RX ADMIN — ATORVASTATIN CALCIUM SCH MG: 20 TABLET, FILM COATED ORAL at 08:32

## 2017-01-20 RX ADMIN — INSULIN ASPART SCH: 100 INJECTION, SOLUTION INTRAVENOUS; SUBCUTANEOUS at 06:32

## 2017-01-20 RX ADMIN — LISINOPRIL SCH MG: 10 TABLET ORAL at 08:33

## 2017-01-20 RX ADMIN — ASPIRIN SCH MG: 81 TABLET ORAL at 08:32

## 2017-01-20 RX ADMIN — PANTOPRAZOLE SCH MG: 40 TABLET, DELAYED RELEASE ORAL at 08:32

## 2017-01-20 NOTE — HHI.PR
Subjective


Remarks


Patient seen and examined today with Dr. Santillan.  Patient states that after 

the Decadron and Elavil his headache has improved.  No longer experiencing 

numbness in his left upper extremity.  Discussed with neurology extensively.  

Plans for EP MRI today and if normal plans to discharge home.





Objective


Vitals





 Vital Signs








  Date Time  Temp Pulse Resp B/P Pulse Ox O2 Delivery O2 Flow Rate FiO2


 


1/20/17 08:00 96.2 70 20 123/88 92   


 


1/20/17 00:00 96.9 85 18 121/81 93   


 


1/19/17 20:00 97.3 92 18 156/107 92   


 


1/19/17 16:00 97.1 91 18 135/100 96   


 


1/19/17 12:00 97.7 95 20 158/99 96   


 


1/19/17 10:55  72      








 I/O








 1/19/17 1/19/17 1/19/17 1/20/17 1/20/17 1/20/17





 07:00 15:00 23:00 07:00 15:00 23:00


 


Intake Total  650 ml    


 


Balance  650 ml    


 


      


 


Intake Oral  650 ml    








Result Diagram:  


1/17/17 2050 1/18/17 1240





Objective Remarks


GENERAL: Well-developed, well-nourished, in no acute distress. alert and 

orientated


HEENT: Head is normocephalic without any lesions or masses noted. Facial 

features are symmetric. Eyes: Extraocular muscles are intact. Conjunctivae were 

clear.


NECK: Supple without any masses. Trachea midline no deviation. No JVD,


CARDIAC: Regular rhythm, regular rate. S1/S2 are heard. No murmurs gallops or 

rubs.


LUNGS: Clear to auscultation bilaterally. No wheeze, rhonchi or rales. No use 

of accessory muscles on inspiration or expiration.


ABDOMEN: Soft, nontender. Nondistended. Bowel sounds heard in all 4 quadrants. 

No organomegaly or masses. Negative rebound, negative guarding


EXTREMITIES: No edema, pulses are equal bilaterally. No cyanosis or clubbing


NEUROLOGY: Mood and affect appear appropriate. Cranial nerves II through XII 

grossly intact.  Moving all extremities, speech is clear


Urinary Catheter:  No


Vascular Central Line Catheter:  No





A/P


Assessment and Plan


Left facial pain, left upper and lower extremity weakness, paresthesia.  Ruled 

out TIA/CVA, unknown etiology


CT scan is negative for acute etiology, unlikely CVA since symptoms have been 

ongoing for over 3 days


MRI/MRA of the brain, unremarkable


MRA neck: Unremarkable


MRV of the brain unremarkable


Cervical spine MRI showed broad based bulging at C5-C6, however no 

encroachment on neural foramina


echocardiogram with normal systolic function, ejection fraction 50-55%


Further laboratory studies to include sedimentation rate, B12, folate, TSH, 

RPR were normal


Patient on aspirin 162 mg daily


OT/ST/PT evaluations: Were unremarkable


Neurology following the patient, discussed with him and recommended repeat MRI 

today and if normal can discharge home


Status post starting of amitriptyline, discontinued Topamax, given Decadron 10 

mg IV 1 today





Hyperlipidemia, mixed


Continue Lipitor 20 mg daily





Chest pain, atypical


Serial cardiac enzymes were performed and did not indicate any acute coronary 

event


Serial EKGs did not indicate any changes


Start aspirin, nitroglycerin as needed


Patient has had exercise stress test November, 2015 which was normal and no 

signs of ischemia





Hypokalemia


Replace and continue to monitor





Hypertension


Norvasc 10 mg daily


lisinopril 10 mg daily


Clonidine as needed





Hyperglycemia/diabetes


Patient has had hyperglycemia dating back to 2015, highest is 276.  


Hemoglobin A1c 7.8


Patient underwent diabetic education, dietary education


Accu-Cheks with sliding scale insulin: 8 units of insulin given in the last 24 

hours





Hypokalemia


Replace and continue monitor





DVT prevention


Sequential compression devices





Written by Esau Hong PA-C, acting as scribe for Dr. Santillan on 1/20/17 

at 1015.  


The documentation accurately reflects the work and decisions performed face-to-

face by Dr. Santillan on 1/20/17 at 1015.


Discharge Planning


Discharge home in stable condition if MRI is negative








Esau Hong Jan 20, 2017 10:55

## 2017-01-20 NOTE — HHI.DS
__________________________________________________





Discharge Summary


Admission Date


Jan 17, 2017 at 22:18


Discharge Date:  Jan 20, 2017


Admitting Diagnosis


L Face/LUE/LLE Weakness, Cephalgia, HypoK, Hyperglycemia





(1) Numbness and tingling


ICD Code:  R20.0


Diagnosis:  Principal





(2) Weakness


ICD Code:  R53.1


Diagnosis:  Principal





(3) Facial droop


ICD Code:  R29.810


Diagnosis:  Principal





(4) Unilateral headache


ICD Code:  R51


Diagnosis:  Principal





(5) Hyperglycemia


ICD Code:  R73.9


Diagnosis:  Principal





(6) Chest pain


ICD Code:  R07.9


Diagnosis:  Principal





(7) Hypertension


ICD Code:  I10


Diagnosis:  Secondary





(8) History of CVA (cerebrovascular accident)


ICD Code:  Z86.73


Diagnosis:  Secondary





Procedures


None


Brief History - From Admission


41 year-old  male with known history of hypertension, TIA who 

presented to the hospital because of multiple symptoms to include left facial 

pain, left upper and lower extremity numbness and weakness, chest pain.  

Patient indicates that this discomfort has been going on for quite some time, 

he cannot give detail how long.  He states that over the last 3 days he is had 

a pain in the left side of his face which he describes as a sharp pain like a 

weight is on the left side of his face.  It has been 10/10 on a pain scale for 

3 days.  He did present to emergency department 2 days ago and was recommended 

that he would be admitted for CVA workup.  However patient left AGAINST MEDICAL 

ADVICE because he had workup obligations.  Patient indicates the discomfort and 

symptoms did not go away and persisted so he came back to the hospital.  He 

does indicate that he has a pain on the left side of his face.  He states that 

he has a left-sided facial droop.  Patient has numbness and tingling in the 

left arm and leg.  He indicates that he is lost strength in his left upper 

extremity.  He denies any visual disturbances, difficulty in eating, difficulty 

in speaking, gait disturbance.  Patient still complaining of a 10/10 left-sided 

facial pain.  He is requesting pain medications.  Patient states that he also 

has chest pain which has been rather persistent.  States pain is located center 

part of his chest radiating into his back.  He believes that he has had 

associated diaphoresis.  Patient indicates that is positional.  He states that 

whenever he is lying on his back he develops chest pain, however when he rolls 

over onto his left side the pain goes away.  Denies any nausea, vomiting, 

shortness of breath, dyspnea.  Patient was seen in 2015 by myself and underwent 

chest pain center workup with exercise stress test which was normal.  Patient 

accepted hospitalization this visit for further workup.


CBC/BMP:  


1/17/17 2050 1/18/17 1240





Significant Findings





Laboratory Tests








Test 1/17/17 1/18/17 1/18/17 1/18/17





 20:50 07:00 12:40 18:46


 


Potassium Level 3.2 MEQ/L  3.3 MEQ/L 





 (3.5-5.1)  (3.5-5.1) 


 


Estimat Glomerular Filtration 83 ML/MIN (>89)   





Rate    


 


Random Glucose 276 MG/DL  222 MG/DL 





 ()  () 


 


Calcium Level 8.4 MG/DL  8.2 MG/DL 





 (8.5-10.1)  (8.5-10.1) 


 


Hemoglobin A1c  7.8 % (4.3-6.0)  


 


Troponin I  LESS THAN 0.02 LESS THAN 0.02 LESS THAN 0.02





  NG/ML NG/ML NG/ML





  (0.02-0.05) (0.02-0.05) (0.02-0.05)


 


Triglycerides Level  314 MG/DL  





  ()  


 


HDL Cholesterol  28.1 MG/DL  





  (40.0-60.0)  


 


Alkaline Phosphatase   131 U/L 





   () 


 


Albumin   3.1 GM/DL 





   (3.4-5.0) 








Imaging





Last Impressions








Brain MRI 1/20/17 1707 Signed





Impressions: 





 Service Date/Time:  Friday, January 20, 2017 13:04 - CONCLUSION:  1. No 

evidence 





 of acute intracranial pathology. No masses are identified.     Joshua Bertrand MD 


 


Head/Brain Mag Res Venography 1/19/17 0000 Signed





Impressions: 





 Service Date/Time:  Thursday, January 19, 2017 11:55 - CONCLUSION: 

Unremarkable 





 MR venography of the brain.   Joshua Bertrand MD 


 


Cervical Spine MRI 1/19/17 0000 Signed





Impressions: 





 Service Date/Time:  Thursday, January 19, 2017 11:55 - CONCLUSION:  1. Limited 





 examination due to motion artifact on most of the images. 2. Broad-based 

bulging 





 at C5-6. 3. 1.8 cm probable dermal cyst posterior upper left back.     Garcia Gan MD 


 


Neck Magnetic Resonance Angiography 1/18/17 0927 Signed





Impressions: 





 Service Date/Time:  Wednesday, January 18, 2017 14:32 - CONCLUSION:  





 Unremarkable MR angiography of the carotid arteries    Joshua Bertrand MD 


 


Head Magnetic Resonance Angiography 1/18/17 0000 Signed





Impressions: 





 Service Date/Time:  Wednesday, January 18, 2017 14:32 - CONCLUSION:  1. 





 Unremarkable MR angiography of the brain.     Joshua Bertrand MD 


 


Head CT 1/17/17 2044 Signed





Impressions: 





 Service Date/Time:  Tuesday, January 17, 2017 21:13 - CONCLUSION: Negative for 





 an acute process.    Tc Borrego MD  FACR








PE at Discharge


GENERAL: Well-developed, well-nourished, in no acute distress. alert and 

orientated


HEENT: Head is normocephalic without any lesions or masses noted. Facial 

features are symmetric. Eyes: Extraocular muscles are intact. Conjunctivae were 

clear.


NECK: Supple without any masses. Trachea midline no deviation. No JVD,


CARDIAC: Regular rhythm, regular rate. S1/S2 are heard. No murmurs gallops or 

rubs.


LUNGS: Clear to auscultation bilaterally. No wheeze, rhonchi or rales. No use 

of accessory muscles on inspiration or expiration.


ABDOMEN: Soft, nontender. Nondistended. Bowel sounds heard in all 4 quadrants. 

No organomegaly or masses. Negative rebound, negative guarding


EXTREMITIES: No edema, pulses are equal bilaterally. No cyanosis or clubbing


NEUROLOGY: Mood and affect appear appropriate. Cranial nerves II through XII 

grossly intact.  Moving all extremities, speech is clear


Hospital Course


Left facial pain, left upper and lower extremity weakness, paresthesia.  Ruled 

out TIA/CVA, unknown etiology - possibly related to previous dental work as per 

the pt's wife, patient occasionally gets dental and facial pain


CT scan is negative for acute etiology, unlikely CVA since symptoms have been 

ongoing for over 3 days, also not likely TIA due to duration of symptoms


MRI/MRA of the brain, unremarkable


MRA neck: Unremarkable


MRV of the brain unremarkable


Cervical spine MRI showed broad based bulging at C5-C6, however no 

encroachment on neural foramina


echocardiogram with normal systolic function, ejection fraction 50-55%


Further laboratory studies to include sedimentation rate, B12, folate, TSH, 

RPR were normal


Patient on aspirin 162 mg daily


OT/ST/PT evaluations: Were unremarkable


Neurology following the patient, discussed with him and recommended repeat MRI 

today and if normal can discharge home


Status post starting of amitriptyline, discontinued Topamax, given Decadron 10 

mg IV 1 today symptoms improved


-Continue amitryptiline, aspirin, and recommend follow up with dentist


-Also strongly recommended to patient and wife referral for sleep study test as 

patient appears to have significant sleep apnea





Hyperlipidemia, mixed


Continue Lipitor 20 mg daily





Chest pain, atypical


Serial cardiac enzymes were performed and did not indicate any acute coronary 

event


Serial EKGs did not indicate any changes


Start aspirin, nitroglycerin as needed


Patient has had exercise stress test November, 2015 which was normal and no 

signs of ischemia





Hypokalemia


Replace and continue to monitor





Hypertension


Norvasc 10 mg daily


lisinopril 10 mg daily


Clonidine as needed





Hyperglycemia/diabetes


Patient has had hyperglycemia dating back to 2015, highest is 276.  


Hemoglobin A1c 7.8


Patient underwent diabetic education, dietary education


Accu-Cheks with sliding scale insulin: 8 units of insulin given in the last 24 

hours


-Started on metformin as outpatient


-F/u at [facility] for diabetes treatment and education


-No indication for accucheck machine with H1bA1c low value





Hypokalemia


Replaced


Pt Condition on Discharge:  Stable


Discharge Disposition:  Discharge Home


Discharge Time:  > 30 minutes


Discharge Instructions


DIET: Follow Instructions for:  Diabetic Diet


Speech Therapy-Diet Recommends:  Mechanical Soft


Activities you can perform:  Regular-No Restrictions


Follow up Referrals:  


Neurology - 2 Weeks with Joshua Garcia MD


PCP Follow-up - 1 Week





New Medications:  


Metformin (Metformin) 500 Mg Tab


500 MG PO BIDPC With meals Blood Sugar Management #60 Ref 0 TAB


Amitriptyline HCl (Elavil) 25 Mg Tab


25 MG PO HS headache #30 TAB


Aspirin DR (Aspirin EC) 81 Mg Tabdr


162 MG PO DAILY heart and neuro protection #60 TAB


Atorvastatin (Lipitor) 20 Mg Tab


20 MG PO DAILY Cholesterol Management #30 TAB


Lisinopril (Lisinopril) 10 Mg Tab


10 MG PO DAILY Blood Pressure Management #30 TAB


 


Continued Medications:  


Amlodipine (Norvasc) 10 Mg Tab


10 MG PO DAILY Blood Pressure Management #90 Ref 0 TAB


Ascorbic Acid (Vitamin C) 100 Mg Chew


100 MG CHEW DAILY Nutritional Supplement Ref 0 TAB


 


Discontinued Medications:  


Oxycodone-Acetaminophen (Percocet) 5-325 mg Tab


1 TAB PO Q4H PRN PAIN #30 Ref 0 TAB











Lois Santillan MD Jan 20, 2017 16:36

## 2017-01-20 NOTE — RADRPT
EXAM DATE/TIME:  01/20/2017 13:04 

 

[facility] COMPARISON:     

MRI BRAIN  W & W/O CONTRAST, January 18, 2017, 14:32.

       

 

 

INDICATIONS :     

Abnormal prior MRI in left cerebelum.

                     

 

CONTRAST:     

20 cc Omniscan (gadodiamide) IV

                     

 

MEDICAL HISTORY :     

Hypertension. Myocardial infarction.   

 

SURGICAL HISTORY :           

Rt knee

 

ENCOUNTER:     

Subsequent

 

ACUITY:     

4-6 days

 

PAIN SCORE:     

0/10

 

LOCATION:       

cranial 

 

TECHNIQUE:     

Multiplanar, multisequence MRI of the brain was performed both prior to and following the administrat
ion of paramagnetic contrast.

 

FINDINGS:     

 

CEREBRUM:     

The ventricles are normal for age.  No evidence of midline shift, mass lesion, hemorrhage or acute in
farction.  No extraaxial fluid collections are seen.  The pituitary gland and suprasellar cistern are
 normal in configuration.

 

WHITE MATTER:     

No significant signal abnormalities are seen in the white matter.

 

POSTERIOR FOSSA:     

The cerebellum and brainstem are intact.  The 4th ventricle is midline. The cerebellopontine angle is
 unremarkable.  The cerebellar tonsils are normal in position. No masses identified within the left c
erebellar hemisphere.

 

DIFFUSION IMAGING:     

No focal areas of restricted diffusion are seen.  No evidence of acute infarction.

 

EXTRACRANIAL:     

The visualized portions of the orbits and paranasal sinuses are unremarkable.

 

POST-CONTRAST:     

No abnormal areas of parenchymal or dural enhancement.  No evidence of blood-brain barrier breakdown.


 

CONCLUSION:     

1. No evidence of acute intracranial pathology. No masses are identified.

 

 

 

 Joshua Bertrand MD on January 20, 2017 at 14:18           

Board Certified Radiologist.

 This report was verified electronically.

## 2017-01-20 NOTE — HHI.PR
Subjective


Remarks


ha gone after steroids and elavil





Objective





 Vital Signs








  Date Time  Temp Pulse Resp B/P Pulse Ox O2 Delivery O2 Flow Rate FiO2


 


1/20/17 00:00 96.9 85 18 121/81 93   


 


1/19/17 20:00 97.3 92 18 156/107 92   


 


1/19/17 16:00 97.1 91 18 135/100 96   


 


1/19/17 12:00 97.7 95 20 158/99 96   


 


1/19/17 10:55  72      








 I/O








 1/19/17 1/19/17 1/19/17 1/20/17 1/20/17 1/20/17





 07:00 15:00 23:00 07:00 15:00 23:00


 


Intake Total  650 ml    


 


Balance  650 ml    


 


      


 


Intake Oral  650 ml    








Result Diagram:  


1/17/17 2050 1/18/17 1240





Objective Remarks


awakens sp sedation


now no longer has ha or left numbness


vff face sym 5/5 t/o ue and le bilat





left pupil 4 mm r 3.5





Assessment and Plan


Assessment and Plan


mri /a/a v and c spine mri neg


labs neg





try decadron and elavil for ha better





there was small left cbllr abn on mri brain recheck with better image we are 

checking this mri and if neg ok to dc later








Joshua Garcia MD Jan 20, 2017 09:14

## 2017-01-21 LAB — VIT B6 SERPL-MCNC: 4.8 NG/ML (ref 2.1–21.7)

## 2017-01-23 LAB
FACTOR V LEIDEN REVIEW: (no result)
THROMBIN TIME FOR LA: (no result) SEC (ref 13–19)

## 2017-01-24 NOTE — HM
Date Performed: 01/19/2017       Time Performed: 18:31:00

 

HOOKUP DATE:      01/19/17 06:31:00 PM Thu 

 

     

ANALYSIS START TIME:      1/19/2017 6:36:00 PM

     

ANALYSIS END TIME:      1/20/2017 3:02:58 PM

     

PATIENT AGE:      41

     

PATIENT HEIGHT:      69

     

PATIENT WEIGHT:      244

     

DRUG LIST     

     

PATIENT DIAGNOSIS:      NEURO

     

 

TEST NARRATIVE:          The patient's average heart rate was 82 BPM.  Heart rates greater than 120 B
PM were noted < 1% of the time.  No episodes of bradycardia were noted.     No pauses exceeding 2.0 s
econds were noted.     1 ventricular ectopics, which represented < 1% of the total beat count, were n
oted.  The highest ventricular ectopic frequency occurred from 05:00 AM to 06:00 AM Fri.  During this
 time 1 VE(s) occurred.  Ventricular ectopics were observed as 1 isolated beat(s) only.  No couplets 
or runs were noted.     5 supraventricular ectopics, which represented < 1% of the total beat count, 
were noted.  The highest supraventricular ectopic frequency occurred from 02:00 PM to 03:00 PM Fri.  
During this time 2 SVE(s) occurred.     Multiple episodes of ST depression (defined as -1.0 mm or mor
e)  were noted in channel 1.  The maximum depression of -1.6 mm occurred at 10:16:18 PM Thu.  Multipl
e episodes of ST depression (defined as -1.0 mm or more)  were noted in channel 2.  The maximum depre
ssion of -1.5 mm occurred at 02:50:22 PM Fri.  No episodes of ST depression (defined as -1.0 mm or mo
re) were noted in channel 3.      No diary events were reported by the patient.     

TEST INTERPRETATION:      1) Normal Sinus rhythm 

 

 2) Very few PVC/PACs 3)  Multiple episodes of ST depression (defined as -1.0 mm or more)  were noted
 in channel 1.  The maximum depression of -1.6 mm occurred at 10:16:18 PM Thu.  Multiple episodes of 
ST depression (defined as -1.0 mm or more)  were noted in channel 2.  The maximum depression of -1.5 
mm occurred at 02:50:22 PM Fri.  No episodes of ST depression (defined as -1.0 mm or more) were noted
 in channel 3. 4) No diary returned 5) No arrhythmias noted                                          
                          Signed by : Jaiden Lamb

## 2017-04-12 ENCOUNTER — HOSPITAL ENCOUNTER (EMERGENCY)
Dept: HOSPITAL 17 - PHED | Age: 42
LOS: 1 days | Discharge: HOME | End: 2017-04-13
Payer: MEDICAID

## 2017-04-12 VITALS — WEIGHT: 244.93 LBS | HEIGHT: 70 IN | BODY MASS INDEX: 35.07 KG/M2

## 2017-04-12 VITALS
RESPIRATION RATE: 20 BRPM | SYSTOLIC BLOOD PRESSURE: 152 MMHG | OXYGEN SATURATION: 97 % | HEART RATE: 78 BPM | TEMPERATURE: 98.1 F | DIASTOLIC BLOOD PRESSURE: 93 MMHG

## 2017-04-12 DIAGNOSIS — I10: ICD-10-CM

## 2017-04-12 DIAGNOSIS — Z86.73: ICD-10-CM

## 2017-04-12 DIAGNOSIS — S30.860A: ICD-10-CM

## 2017-04-12 DIAGNOSIS — T78.40XA: Primary | ICD-10-CM

## 2017-04-12 DIAGNOSIS — E11.9: ICD-10-CM
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
## 2017-04-12 DIAGNOSIS — Z79.84: ICD-10-CM
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
## 2017-04-12 DIAGNOSIS — F17.290: ICD-10-CM
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
## 2017-04-12 PROCEDURE — 99282 EMERGENCY DEPT VISIT SF MDM: CPT

## 2017-04-13 VITALS
DIASTOLIC BLOOD PRESSURE: 82 MMHG | RESPIRATION RATE: 18 BRPM | HEART RATE: 80 BPM | OXYGEN SATURATION: 97 % | SYSTOLIC BLOOD PRESSURE: 170 MMHG

## 2017-04-13 VITALS
DIASTOLIC BLOOD PRESSURE: 85 MMHG | HEART RATE: 84 BPM | SYSTOLIC BLOOD PRESSURE: 159 MMHG | OXYGEN SATURATION: 97 % | RESPIRATION RATE: 18 BRPM

## 2017-04-13 NOTE — PD
HPI


Chief Complaint:  Skin Problem


Time Seen by Provider:  04:06


Travel History


International Travel<30 days:  No


Contact w/Intl Traveler<30days:  No


Traveled to known affect area:  No





History of Present Illness


HPI


The patient is a 41-year-old male that states he got bit by an apparent insect/

arthropod on the left buttocks at approximately 9 PM tonight on the job.  After 

he got bit, he developed a pruritic rash on the arms, trunk.  He denies any 

wheezing or shortness of breath.  The patient is a metformin controlled 

diabetic.  Because this ER was busy, he did not get seen until now, 0400 and he 

is much better now.  Nevertheless, he appears to have had a systemic allergic 

reaction to this unknown bite.  He has no history of heart disease.





PFSH


Past Medical History


Hx Anticoagulant Therapy:  No


Heart Rhythm Problems:  No


Cardiac Catheterization:  Yes


Cardiovascular Problems:  Yes (MI, HTN,*PT DENIES HAVE AN MI)


High Cholesterol:  No


Congestive Heart Failure:  No


Cerebrovascular Accident:  Yes (TIA 2013)


Diabetes:  No


Diminished Hearing:  No


Gastrointestinal Disorders:  Yes (ABDOMINAL HERNIA)


Heparin Induced Thrombocytopen:  No


Hypertension:  Yes


Respiratory:  Yes


Immunizations Current:  Yes


Myocardial Infarction:  Yes


Pneumonia:  Yes (WITH MRSA)


Sleep Apnea:  Yes


Tetanus Vaccination:  < 5 Years





Past Surgical History


Cardiac Surgery:  Yes (HEART CATHERIZATION)


Coronary Artery Bypass Graft:  No


Thoracic Surgery:  Yes (RIGHT KNEE)


Other Surgery:  Yes





Family History


Family Myocardial Infarction:  Yes (FATHER AND GRANDFATHER)


Family Hypercholesterolemia:  Yes





Social History


Alcohol Use:  No


Tobacco Use:  Yes (3-4 CIGARS /DAY)


Substance Use:  Yes (Methanphedaines, Cocaine, Heroine )





Allergies-Medications


(Allergen,Severity, Reaction):  


Coded Allergies:  


     Penicillin (Verified  Allergy, Severe, Anaphylaxis, 4/13/17)


     Sulfa (Verified  Allergy, Severe, Anaphylaxis, 4/13/17)


Reported Meds & Prescriptions





Reported Meds & Active Scripts


Active


Lipitor (Atorvastatin Calcium) 20 Mg Tab 20 Mg PO DAILY


Metformin (Metformin HCl) 500 Mg Tab 500 Mg PO BIDPC


     With meals


Lisinopril 10 Mg Tab 10 Mg PO DAILY


Elavil (Amitriptyline HCl) 25 Mg Tab 25 Mg PO HS


Norvasc (Amlodipine Besylate) 10 Mg Tab 10 Mg PO DAILY


Reported


Vitamin C (Ascorbic Acid) 100 Mg Chew 100 Mg CHEW DAILY








Review of Systems


Except as stated in HPI:  all other systems reviewed are Neg





Physical Exam


Narrative


GENERAL: Well-nourished, obese, alert and oriented patient in minimal apparent 

distress with his pruritic rash.  His blood pressure is 152/93 but the rest the 

vital signs are normal.


SKIN: Focused skin assessment warm/dry.  There is an erythematous, pruritic, 

maculopapular rash distributed on the arms and anterior/posterior aspects of 

the trunk.  The bite bisi is on the left buttocks at the superior 

intertriginous area of the left buttocks.  There is definite swelling there but 

there is no foreign body or stinger noted.


HEAD: Normocephalic.


EYES: No scleral icterus. No injection or drainage. 


NECK: Supple, trachea midline. No JVD or lymphadenopathy.


CARDIOVASCULAR: Regular rate and rhythm without murmurs, gallops, or rubs. 


RESPIRATORY: Breath sounds equal bilaterally. No accessory muscle use.


GASTROINTESTINAL: Abdomen soft, non-tender, nondistended. 


MUSCULOSKELETAL: No cyanosis, or edema. 


BACK: Nontender without obvious deformity. No CVA tenderness.





Data


Data


Last Documented VS





Vital Signs








  Date Time  Temp Pulse Resp B/P Pulse Ox O2 Delivery O2 Flow Rate FiO2


 


4/12/17 22:35 98.1 78 20 152/93 97   











MDM


Medical Decision Making


Medical Screen Exam Complete:  Yes


Emergency Medical Condition:  Yes


Medical Record Reviewed:  Yes


Differential Diagnosis


Allergic reaction to bite, cellulitis, viral rash


Narrative Course


The patient appears to have an allergic reaction to a bite.  The bite is 

unknown but it appears to be a systemic reaction.





Plan: The patient will be given a tapered course of prednisone, Benadryl, 

Zantac and an EpiPen should another bite occur.  His next reaction may be more 

severe.  The patient is told this.





Physician Communication


Physician Communication


The EpiPen is for the next bite.  The next bite may give you more severe 

reaction.  The reaction to this bite appears to be a systemic allergic 

reaction.  Follow-up with your primary care physician within 10 days.





Diagnosis





 Primary Impression:  


 Reaction to insect bite


***Med/Other Pt SpecificInfo:  Prescription(s) given


Scripts


Epinephrine Inj (Epipen 2-Fernando Inj)0.3 Mg/0.3 Ml Pfpen0.3 Mg IM ONCE PRN (

ALLERGIC REACTION) #1 PACK  Ref 0


   Prov:Tab Hong MD         4/13/17 


Diphenhydramine (Benadryl Allergy)25 Mg Tab25 Mg PO Q8HR PRN (ALLERGIES) #60 

TAB  Ref 0


   Prov:Tab Hong MD         4/13/17 


Prednisone 50 Mg Tab50 Mg PO BID NEB  #12 TAB  Ref 0


   Prov:Tab Hong MD         4/13/17 


Ranitidine (Zantac)150 Mg Hyq843 Mg PO BID  #30 TAB  Ref 0


   Prov:Tab Hong MD         4/13/17


Disposition:  01 DISCHARGE HOME


Condition:  Stable








Tab Hong MD Apr 13, 2017 04:18

## 2017-11-24 ENCOUNTER — HOSPITAL ENCOUNTER (EMERGENCY)
Dept: HOSPITAL 17 - PHED | Age: 42
Discharge: LEFT BEFORE DISCHARGE | End: 2017-11-24
Payer: MEDICAID

## 2017-11-24 VITALS — SYSTOLIC BLOOD PRESSURE: 133 MMHG | DIASTOLIC BLOOD PRESSURE: 80 MMHG | TEMPERATURE: 98.4 F

## 2017-11-24 VITALS
TEMPERATURE: 98.3 F | RESPIRATION RATE: 20 BRPM | HEART RATE: 96 BPM | OXYGEN SATURATION: 95 % | SYSTOLIC BLOOD PRESSURE: 163 MMHG | DIASTOLIC BLOOD PRESSURE: 95 MMHG

## 2017-11-24 DIAGNOSIS — I25.2: ICD-10-CM

## 2017-11-24 DIAGNOSIS — I10: ICD-10-CM

## 2017-11-24 DIAGNOSIS — E78.00: ICD-10-CM

## 2017-11-24 DIAGNOSIS — E11.65: Primary | ICD-10-CM

## 2017-11-24 DIAGNOSIS — F17.200: ICD-10-CM

## 2017-11-24 LAB
ALP SERPL-CCNC: 162 U/L (ref 45–117)
ALT SERPL-CCNC: 47 U/L (ref 12–78)
ANION GAP SERPL CALC-SCNC: 10 MEQ/L (ref 5–15)
AST SERPL-CCNC: 15 U/L (ref 15–37)
B-OH-BUTYR SERPL-SCNC: 0.16 MMOL/L (ref 0–0.39)
BACTERIA #/AREA URNS HPF: (no result) /HPF
BASOPHILS # BLD AUTO: 0.2 TH/MM3 (ref 0–0.2)
BASOPHILS NFR BLD: 2.2 % (ref 0–2)
BILIRUB SERPL-MCNC: 0.4 MG/DL (ref 0.2–1)
BUN SERPL-MCNC: 15 MG/DL (ref 7–18)
CHLORIDE SERPL-SCNC: 93 MEQ/L (ref 98–107)
COLOR UR: (no result)
COMMENT (UR): (no result)
CULTURE IF INDICATED: (no result)
EOSINOPHIL # BLD: 0.2 TH/MM3 (ref 0–0.4)
EOSINOPHIL NFR BLD: 3 % (ref 0–4)
ERYTHROCYTE [DISTWIDTH] IN BLOOD BY AUTOMATED COUNT: 13 % (ref 11.6–17.2)
GFR SERPLBLD BASED ON 1.73 SQ M-ARVRAT: 67 ML/MIN (ref 89–?)
GLUCOSE UR STRIP-MCNC: (no result) MG/DL
HCO3 BLD-SCNC: 25.9 MEQ/L (ref 21–32)
HCT VFR BLD CALC: 44.4 % (ref 39–51)
HEMO FLAGS: (no result)
HGB UR QL STRIP: (no result)
KETONES UR STRIP-MCNC: (no result) MG/DL
LEUKOCYTE ESTERASE UR QL STRIP: (no result) /HPF (ref 0–5)
LYMPHOCYTES # BLD AUTO: 2.3 TH/MM3 (ref 1–4.8)
LYMPHOCYTES NFR BLD AUTO: 30.6 % (ref 9–44)
MCH RBC QN AUTO: 27.8 PG (ref 27–34)
MCHC RBC AUTO-ENTMCNC: 33.5 % (ref 32–36)
MCV RBC AUTO: 82.7 FL (ref 80–100)
MONOCYTES NFR BLD: 6.8 % (ref 0–8)
NEUTROPHILS # BLD AUTO: 4.5 TH/MM3 (ref 1.8–7.7)
NEUTROPHILS NFR BLD AUTO: 57.4 % (ref 16–70)
NITRITE UR QL STRIP: (no result)
PLATELET # BLD: 185 TH/MM3 (ref 150–450)
POTASSIUM SERPL-SCNC: 3.6 MEQ/L (ref 3.5–5.1)
RBC # BLD AUTO: 5.36 MIL/MM3 (ref 4.5–5.9)
RBC #/AREA URNS HPF: (no result) /HPF (ref 0–3)
SODIUM SERPL-SCNC: 129 MEQ/L (ref 136–145)
SP GR UR STRIP: 1.03 (ref 1–1.03)
SQUAMOUS #/AREA URNS HPF: (no result) /HPF (ref 0–5)
WBC # BLD AUTO: 7.7 TH/MM3 (ref 4–11)

## 2017-11-24 PROCEDURE — 81001 URINALYSIS AUTO W/SCOPE: CPT

## 2017-11-24 PROCEDURE — 80053 COMPREHEN METABOLIC PANEL: CPT

## 2017-11-24 PROCEDURE — 85025 COMPLETE CBC W/AUTO DIFF WBC: CPT

## 2017-11-24 PROCEDURE — 96361 HYDRATE IV INFUSION ADD-ON: CPT

## 2017-11-24 PROCEDURE — 99284 EMERGENCY DEPT VISIT MOD MDM: CPT

## 2017-11-24 PROCEDURE — 83880 ASSAY OF NATRIURETIC PEPTIDE: CPT

## 2017-11-24 PROCEDURE — 96374 THER/PROPH/DIAG INJ IV PUSH: CPT

## 2017-11-24 PROCEDURE — 82010 KETONE BODYS QUAN: CPT

## 2017-11-24 NOTE — PD
HPI


Chief Complaint:  Diabetic


Time Seen by Provider:  21:27


Travel History


International Travel<30 days:  No


Contact w/Intl Traveler<30days:  No


Traveled to known affect area:  No





History of Present Illness


HPI


The patient is a 42-year-old male that complains of polyuria/polydipsia for 

about a week.  He denies any chest discomfort or shortness of breath.  He 

denies any fever.  He states he was taken off his metformin because it caused 

him pain in his kidneys and bladder.  He is no longer has a primary care 

physician.  He states he cannot find a primary care physician that will take 

his Medicaid chair of cost.  He denies any nausea, vomiting or diarrhea.





PFSH


Past Medical History


Hx Anticoagulant Therapy:  No


Heart Rhythm Problems:  No


Cardiac Catheterization:  Yes (age 36)


Cardiovascular Problems:  Yes


High Cholesterol:  Yes


Congestive Heart Failure:  No


Cerebrovascular Accident:  Yes (TIA 2013)


Diabetes:  Yes


Diminished Hearing:  No


Gastrointestinal Disorders:  Yes


Heparin Induced Thrombocytopen:  No


Hypertension:  Yes


Kidney Stones:  Yes


Respiratory:  Yes


Immunizations Current:  Yes


Myocardial Infarction:  Yes


Pneumonia:  Yes (WITH MRSA)


Sleep Apnea:  Yes (NO OFFICIAL DIAGNOSIS)





Past Surgical History


Cardiac Surgery:  Yes (HEART CATHERIZATION)


Coronary Artery Bypass Graft:  No


Oral Surgery:  Yes (DENTAL)


Thoracic Surgery:  Yes (RIGHT KNEE)


Other Surgery:  Yes





Family History


Family Hypercholesterolemia:  Yes





Social History


Alcohol Use:  No


Tobacco Use:  Yes (1/2 PACK PER WEEK)


Substance Use:  No





Allergies-Medications


(Allergen,Severity, Reaction):  


Coded Allergies:  


     Sulfa (Sulfonamide Antibiotics) (Verified  Allergy, Severe, Anaphylaxis, 11 /24/17)


     penicillin G (Verified  Allergy, Severe, Anaphylaxis, 11/24/17)


Reported Meds & Prescriptions





Reported Meds & Active Scripts


Active


Lipitor (Atorvastatin Calcium) 20 Mg Tab 20 Mg PO HS


Lisinopril 10 Mg Tab 10 Mg PO DAILY


Norvasc (Amlodipine Besylate) 10 Mg Tab 10 Mg PO DAILY


Zantac (Ranitidine HCl) 150 Mg Tab 150 Mg PO BID


Reported


Vitamin C (Ascorbic Acid) 250 Mg Chew 100 Mg CHEW DAILY








Review of Systems


Except as stated in HPI:  all other systems reviewed are Neg





Physical Exam


Narrative


GENERAL: The patient is obese, alert, dehydrated appearing in no apparent 

distress.  He does not smell of ketones.


SKIN: Focused skin assessment warm/dry.


HEAD: Atraumatic. Normocephalic. 


EYES: Pupils equal and round. No scleral icterus. No injection or drainage. 


ENT: No nasal bleeding or discharge.  Mucous membranes pink and moist.


NECK: Trachea midline. No JVD. 


CARDIOVASCULAR: Regular rate and rhythm.  No murmur appreciated.


RESPIRATORY: No accessory muscle use. Clear to auscultation. Breath sounds 

equal bilaterally. 


GASTROINTESTINAL: Abdomen soft, non-tender, nondistended. Hepatic and splenic 

margins not palpable.  No guarding or rebound is present.


MUSCULOSKELETAL: No obvious deformities. No clubbing.  No cyanosis.  No edema. 


NEUROLOGICAL: Awake and alert. No obvious cranial nerve deficits.  Motor 

grossly within normal limits. Normal speech.


PSYCHIATRIC: Appropriate mood and affect; insight and judgment normal.





Data


Data


Last Documented VS





Vital Signs








  Date Time  Temp Pulse Resp B/P (MAP) Pulse Ox O2 Delivery O2 Flow Rate FiO2


 


11/24/17 21:17 98.3 96 20 163/95 (117) 95   








Orders





 Orders


Complete Blood Count With Diff (11/24/17 21:33)


Comprehensive Metabolic Panel (11/24/17 21:33)


Urinalysis - C+S If Indicated (11/24/17 21:33)


Beta Hydroxybutyrate (Acetone) (11/24/17 21:33)


Sodium Chlor 0.9% 1000 Ml Inj (Ns 1000 M (11/24/17 21:45)


B-Type Natriuretic Peptide (11/24/17 21:33)


Insulin Human Regular Inj (Novolin R Inj (11/24/17 21:45)





Labs





Laboratory Tests








Test


  11/24/17


21:45


 


White Blood Count 7.7 TH/MM3 


 


Red Blood Count 5.36 MIL/MM3 


 


Hemoglobin 14.9 GM/DL 


 


Hematocrit 44.4 % 


 


Mean Corpuscular Volume 82.7 FL 


 


Mean Corpuscular Hemoglobin 27.8 PG 


 


Mean Corpuscular Hemoglobin


Concent 33.5 % 


 


 


Red Cell Distribution Width 13.0 % 


 


Platelet Count 185 TH/MM3 


 


Mean Platelet Volume 10.4 FL 


 


Neutrophils (%) (Auto) 57.4 % 


 


Lymphocytes (%) (Auto) 30.6 % 


 


Monocytes (%) (Auto) 6.8 % 


 


Eosinophils (%) (Auto) 3.0 % 


 


Basophils (%) (Auto) 2.2 % 


 


Neutrophils # (Auto) 4.5 TH/MM3 


 


Lymphocytes # (Auto) 2.3 TH/MM3 


 


Monocytes # (Auto) 0.5 TH/MM3 


 


Eosinophils # (Auto) 0.2 TH/MM3 


 


Basophils # (Auto) 0.2 TH/MM3 


 


CBC Comment DIFF FINAL 


 


Differential Comment  


 


Urine Color STRAW 


 


Urine Turbidity CLEAR 


 


Urine pH 6.5 


 


Urine Specific Gravity 1.033 


 


Urine Protein NEG mg/dL 


 


Urine Glucose (UA)


  1000 OR


GREATER mg/dL


 


Urine Ketones NEG mg/dL 


 


Urine Occult Blood NEG 


 


Urine Nitrite NEG 


 


Urine Bilirubin NEG 


 


Urine Leukocyte Esterase NEG 


 


Urine RBC 0-2 /hpf 


 


Urine WBC 0-2 /hpf 


 


Urine Squamous Epithelial


Cells 0-5 /hpf 


 


 


Urine Bacteria NONE /hpf 


 


Microscopic Urinalysis Comment


  CULT NOT


INDICATED


 


Blood Urea Nitrogen 15 MG/DL 


 


Creatinine 1.20 MG/DL 


 


Random Glucose 543 MG/DL 


 


Total Protein 7.2 GM/DL 


 


Albumin 3.5 GM/DL 


 


Calcium Level 9.4 MG/DL 


 


Alkaline Phosphatase 162 U/L 


 


Aspartate Amino Transf


(AST/SGOT) 15 U/L 


 


 


Alanine Aminotransferase


(ALT/SGPT) 47 U/L 


 


 


Total Bilirubin 0.4 MG/DL 


 


Sodium Level 129 MEQ/L 


 


Potassium Level 3.6 MEQ/L 


 


Chloride Level 93 MEQ/L 


 


Carbon Dioxide Level 25.9 MEQ/L 


 


Anion Gap 10 MEQ/L 


 


Estimat Glomerular Filtration


Rate 67 ML/MIN 


 


 


B-Type Natriuretic Peptide 8 PG/ML 


 


B-Hydroxybutyrate 0.16 MMOL/L 











MDM


Medical Decision Making


Medical Screen Exam Complete:  Yes


Emergency Medical Condition:  Yes


Medical Record Reviewed:  Yes


Interpretation(s)


The CBC is normal.  The beta hydroxybutyrate is normal.  The complete metabolic 

profile shows a sodium of 129 and glucose of 543 and alkaline phosphatase of 

162 but is otherwise normal.  The BNP is normal.





At 11 PM the Accu-Chek is 208.


Differential Diagnosis


Diabetes mellitus poor control, diabetic ketoacidosis, electrolyte disorder, 

dehydration


Narrative Course


The patient needs a primary care physician and has diabetes mellitus out of 

control.  Our plan was to admit the patient.  The patient states that a  

court ordered him to take his son this weekend and that he will go to custodial if 

he gets admitted to the hospital.  He is not likely to do well medically if he 

does not be admitted.  Nevertheless the patient will sign out AMA because he 

does not want to go to custodial.





Diagnosis





 Primary Impression:  


 Poorly controlled diabetes mellitus





***Additional Instructions:  


As we discussed, not coming in the hospital with a blood sugar over 500 can be 

life-threatening.  The risk is compounded by the fact that he cannot find a 

primary care physician.  Hospitalization would have allowed the patient 

consultation with case management who could help find a physician for this 

patient.  Also, the blood sugar would be controlled much better and the patient 

put on appropriate medications.  Hopefully, the court system will be more 

understanding of your medical problems and situation.


Disposition:  07 AGAINST MEDICAL ADVICE


Condition:  Stable











Tab Hong MD Nov 24, 2017 21:38

## 2017-11-26 ENCOUNTER — HOSPITAL ENCOUNTER (EMERGENCY)
Dept: HOSPITAL 17 - PHED | Age: 42
Discharge: HOME | End: 2017-11-26
Payer: MEDICAID

## 2017-11-26 VITALS
RESPIRATION RATE: 18 BRPM | TEMPERATURE: 98.3 F | OXYGEN SATURATION: 95 % | HEART RATE: 95 BPM | DIASTOLIC BLOOD PRESSURE: 70 MMHG | SYSTOLIC BLOOD PRESSURE: 132 MMHG

## 2017-11-26 VITALS — BODY MASS INDEX: 34.55 KG/M2 | WEIGHT: 233.25 LBS | HEIGHT: 69 IN

## 2017-11-26 VITALS
SYSTOLIC BLOOD PRESSURE: 144 MMHG | DIASTOLIC BLOOD PRESSURE: 76 MMHG | HEART RATE: 91 BPM | OXYGEN SATURATION: 94 % | RESPIRATION RATE: 18 BRPM

## 2017-11-26 VITALS — DIASTOLIC BLOOD PRESSURE: 76 MMHG | SYSTOLIC BLOOD PRESSURE: 124 MMHG

## 2017-11-26 DIAGNOSIS — I10: ICD-10-CM

## 2017-11-26 DIAGNOSIS — Z79.84: ICD-10-CM

## 2017-11-26 DIAGNOSIS — E11.65: Primary | ICD-10-CM

## 2017-11-26 DIAGNOSIS — Z79.899: ICD-10-CM

## 2017-11-26 DIAGNOSIS — Z72.0: ICD-10-CM

## 2017-11-26 LAB
BASE EXCESS BLD CALC-SCNC: 0.1 MMOL/L (ref -2–2)
BENZODIAZEPINES PNL UR: 91 % (ref 90–100)
BLOOD GAS CARBOXYHEMOGLOBIN: 3.1 % (ref 0–4)
BLOOD GAS HCO3: 24 MMOL/L (ref 22–26)
BLOOD GAS OXYGEN CONTENT: 19.4 VOL % (ref 12–20)
BLOOD GAS PCO2: 41 MMHG (ref 38–42)
CRITICAL VALUE: NO
DRAW SITE: (no result)
METHGB MFR BLDA: 1.4 % (ref 0–2)
NUMBER OF ARTERIAL PUNCTURES: 1
O2/TOTAL GAS SETTING VFR VENT: 21 %
PO2 BLD: 75 MMHG (ref 61–120)
SALICYLATES SERPL-MCNC: 15.1 G/DL (ref 12–16)
STAT: YES
TEMP CORR TO: 98.6
ULNAR PULSE: PRESENT

## 2017-11-26 PROCEDURE — 36600 WITHDRAWAL OF ARTERIAL BLOOD: CPT

## 2017-11-26 PROCEDURE — 99284 EMERGENCY DEPT VISIT MOD MDM: CPT

## 2017-11-26 PROCEDURE — 96372 THER/PROPH/DIAG INJ SC/IM: CPT

## 2017-11-26 PROCEDURE — 82805 BLOOD GASES W/O2 SATURATION: CPT

## 2017-11-26 NOTE — PD
HPI


Chief Complaint:  Diabetic


Time Seen by Provider:  19:29


Travel History


International Travel<30 days:  No


Contact w/Intl Traveler<30days:  No


Traveled to known affect area:  No





History of Present Illness


HPI


H/O DM, SEEN YESTERDAY BECAUSE HE DOES NOT HAVE A PCP, YESTERDAY ACCUCHECK IN 

500'S, IS CURRENTLY ON GLUCOPHAGE 500MG PO BID AND NO INSULIN.  PATIENT RETURNS 

BECAUSE HIS BLOOD SUGAR IS READING HIGH AT HOME.  PATIENT DENIES N/V/D/ABDPAIN/

CP/ AT THIS POINT.  NO ALLEVIATING/AGGRAVATING FACTORS.





ALL:PCN, SULFA


PMHX: DM, HTN





PFSH


Past Medical History


Hx Anticoagulant Therapy:  No


Heart Rhythm Problems:  No


Cardiac Catheterization:  Yes (age 36)


Cardiovascular Problems:  Yes


High Cholesterol:  Yes


Congestive Heart Failure:  No


Cerebrovascular Accident:  Yes (TIA 2013)


Diabetes:  Yes


Diminished Hearing:  No


Gastrointestinal Disorders:  Yes


Heparin Induced Thrombocytopen:  No


Hypertension:  Yes


Kidney Stones:  Yes


Respiratory:  Yes


Immunizations Current:  Yes


Myocardial Infarction:  Yes


Pneumonia:  Yes (WITH MRSA)


Sleep Apnea:  Yes (NO OFFICIAL DIAGNOSIS)





Past Surgical History


Cardiac Surgery:  Yes (HEART CATHERIZATION)


Coronary Artery Bypass Graft:  No


Joint Replacement:  Yes (RIGHT KNEE 1992)


Oral Surgery:  Yes (DENTAL)


Thoracic Surgery:  Yes (RIGHT KNEE)


Other Surgery:  Yes





Family History


Family Hypercholesterolemia:  Yes





Social History


Alcohol Use:  No


Tobacco Use:  Yes


Substance Use:  No





Allergies-Medications


(Allergen,Severity, Reaction):  


Coded Allergies:  


     Sulfa (Sulfonamide Antibiotics) (Verified  Allergy, Severe, Anaphylaxis, 11 /26/17)


     penicillin G (Verified  Allergy, Severe, Anaphylaxis, 11/26/17)


Reported Meds & Prescriptions





Reported Meds & Active Scripts


Active


Lipitor (Atorvastatin Calcium) 20 Mg Tab 20 Mg PO HS


Lisinopril 10 Mg Tab 10 Mg PO DAILY


Norvasc (Amlodipine Besylate) 10 Mg Tab 10 Mg PO DAILY


Zantac (Ranitidine HCl) 150 Mg Tab 150 Mg PO BID


Reported


Vitamin C (Ascorbic Acid) 250 Mg Chew 100 Mg CHEW DAILY








Review of Systems


Except as stated in HPI:  all other systems reviewed are Neg


Endocrine:  Positive: Polyuria, Polydipsia, Other (BLOOD SUGAR ELEVATED)





Physical Exam


Narrative


GENERAL: 


SKIN: Warm and dry.


HEAD: Atraumatic. Normocephalic. 


EYES: Pupils equal and round. No scleral icterus. No injection or drainage. 


ENT: No nasal bleeding or discharge.  Mucous membranes pink and moist.


NECK: Trachea midline. No JVD. 


CARDIOVASCULAR: Regular rate and rhythm.  


RESPIRATORY: No accessory muscle use. Clear to auscultation. Breath sounds 

equal bilaterally. 


GASTROINTESTINAL: Abdomen soft, non-tender, nondistended. Hepatic and splenic 

margins not palpable. 


MUSCULOSKELETAL: Extremities without clubbing, cyanosis, or edema. No obvious 

deformities. 


NEUROLOGICAL: Awake and alert. No obvious cranial nerve deficits.  Motor 

grossly within normal limits. Five out of 5 muscle strength in the arms and 

legs.  Normal speech.


PSYCHIATRIC: Appropriate mood and affect; insight and judgment normal.





Data


Data


Last Documented VS





Vital Signs








  Date Time  Temp Pulse Resp B/P (MAP) Pulse Ox O2 Delivery O2 Flow Rate FiO2


 


11/26/17 19:39   18  94 Room Air  


 


11/26/17 19:39  91  144/76 (98)    


 


11/26/17 19:22 98.3       








Orders





 Orders


Arterial Blood Gas (Abg) (11/26/17 19:29)


Blood Glucose (11/26/17 19:29)


Insulin Human Regular Inj (Novolin R Inj (11/26/17 20:00)





Labs





Laboratory Tests








Test


  11/26/17


19:35


 


Blood Gas Puncture Site RT RADIAL 


 


Blood Gas Patient Temperature 98.6 


 


Blood Gas HCO3 24 mmol/L 


 


Blood Gas Base Excess 0.1 mmol/L 


 


Blood Gas Oxygen Saturation 91 % 


 


Arterial Blood pH 7.39 


 


Arterial Blood Partial


Pressure CO2 41 mmHG 


 


 


Arterial Blood Partial


Pressure O2 75 mmHG 


 


 


Arterial Blood Oxygen Content 19.4 Vol % 


 


Arterial Blood


Carboxyhemoglobin 3.1 % 


 


 


Arterial Blood Methemoglobin 1.4 % 


 


Blood Gas Hemoglobin 15.1 G/DL 


 


Blood Gas Inspired Oxygen 21 % 











MDM


Medical Decision Making


Medical Screen Exam Complete:  Yes


Emergency Medical Condition:  Yes


Medical Record Reviewed:  Yes


Differential Diagnosis


DKA V HYPERGLYCEMIA


Narrative Course


PATIENT HAD MORE THOROUGH WORKUP YESTERDAY. TODAY ACCUCHECK 'S, ABG 

SHOWED NL ABG/PH SO NO DKA.  PT GIVEN SQ INSULINX1 AND INCREASED METFORMIN 

DOSEAGE TO START TOMORROW





Diagnosis





 Primary Impression:  


 Hyperglycemia


Referrals:  


[facility]


Patient Instructions:  Diabetic Hyperglycemia (ED), General Instructions


Scripts


Metformin (Metformin) 1,000 Mg Tab


1000 MG PO BIDPC for Blood Sugar Management, #60 TAB 0 Refills


   Prov: Magdaleno Hamm MD         11/26/17











Magdaleno Hamm MD Nov 26, 2017 19:39

## 2017-11-27 ENCOUNTER — HOSPITAL ENCOUNTER (EMERGENCY)
Dept: HOSPITAL 17 - PHED | Age: 42
Discharge: HOME | End: 2017-11-27
Payer: MEDICAID

## 2017-11-27 VITALS
HEART RATE: 99 BPM | DIASTOLIC BLOOD PRESSURE: 96 MMHG | SYSTOLIC BLOOD PRESSURE: 176 MMHG | OXYGEN SATURATION: 99 % | RESPIRATION RATE: 18 BRPM

## 2017-11-27 VITALS — SYSTOLIC BLOOD PRESSURE: 141 MMHG | DIASTOLIC BLOOD PRESSURE: 88 MMHG

## 2017-11-27 DIAGNOSIS — Z72.0: ICD-10-CM

## 2017-11-27 DIAGNOSIS — Z87.19: ICD-10-CM

## 2017-11-27 DIAGNOSIS — E78.00: ICD-10-CM

## 2017-11-27 DIAGNOSIS — E11.65: Primary | ICD-10-CM

## 2017-11-27 DIAGNOSIS — I10: ICD-10-CM

## 2017-11-27 DIAGNOSIS — Z79.84: ICD-10-CM

## 2017-11-27 DIAGNOSIS — Z86.79: ICD-10-CM

## 2017-11-27 DIAGNOSIS — R51: ICD-10-CM

## 2017-11-27 DIAGNOSIS — I25.2: ICD-10-CM

## 2017-11-27 DIAGNOSIS — Z87.442: ICD-10-CM

## 2017-11-27 DIAGNOSIS — Z87.09: ICD-10-CM

## 2017-11-27 DIAGNOSIS — G47.30: ICD-10-CM

## 2017-11-27 PROCEDURE — 99284 EMERGENCY DEPT VISIT MOD MDM: CPT

## 2017-11-27 PROCEDURE — 96372 THER/PROPH/DIAG INJ SC/IM: CPT

## 2017-11-27 NOTE — PD
HPI


Chief Complaint:  Diabetic


Time Seen by Provider:  16:52


Travel History


International Travel<30 days:  No


Contact w/Intl Traveler<30days:  No


Traveled to known affect area:  No





History of Present Illness


HPI


42-year-old male who is recently been diagnosed with diabetes came to the 

emergency room for the third time in past 3 days for high blood sugar.  Patient 

was here last night when he was asked to go up on his metformin from 500 g 

twice a day 2000 mg twice a day.  Meanwhile patient has also been drinking 

orange juice and small cups of fruits in sugary preservatives and other high 

sugar diet.  His blood sugar in the emergency room was 346.  He took his 

metformin at 5:30 AM today.  He is complaining of some headache but otherwise 

no medical problems.  He had blood test done on the 24th which were within 

acceptable limits.  Patient does not have a primary care physician.





PFSH


Past Medical History


*** Narrative Medical


List of his past medical, surgical, social and family history is reviewed from 

the nursing note.


Hx Anticoagulant Therapy:  No


Heart Rhythm Problems:  No


Cardiac Catheterization:  Yes (age 36)


Cardiovascular Problems:  Yes


High Cholesterol:  Yes


Congestive Heart Failure:  No


Cerebrovascular Accident:  Yes (TIA 2013)


Diabetes:  Yes


Patient Takes Glucophage:  Yes


Diminished Hearing:  No


Gastrointestinal Disorders:  Yes


Hypertension:  Yes


Kidney Stones:  Yes


Respiratory:  Yes


Immunizations Current:  Yes


Myocardial Infarction:  Yes


Pneumonia:  Yes (WITH MRSA)


Sleep Apnea:  Yes (NO OFFICIAL DIAGNOSIS)


Tetanus Vaccination:  < 5 Years


Influenza Vaccination:  No





Past Surgical History


Cardiac Surgery:  Yes (HEART CATHERIZATION)


Coronary Artery Bypass Graft:  No


Joint Replacement:  Yes (RIGHT KNEE 1992)


Oral Surgery:  Yes (DENTAL)


Thoracic Surgery:  Yes (RIGHT KNEE)


Other Surgery:  Yes





Family History


Family Myocardial Infarction:  Yes (FATHER AND GRANDFATHER)


Family Hypercholesterolemia:  Yes





Social History


Alcohol Use:  No


Tobacco Use:  Yes (1 cigarette/daily)


Substance Use:  No





Allergies-Medications


(Allergen,Severity, Reaction):  


Coded Allergies:  


     Sulfa (Sulfonamide Antibiotics) (Verified  Allergy, Severe, Anaphylaxis, 11 /27/17)


     heparin (Verified  Allergy, Severe, Bleeding, 11/27/17)


     penicillin G (Verified  Allergy, Severe, Anaphylaxis, 11/27/17)


Comments


List of his allergies reviewed from the nursing note.


Reported Meds & Prescriptions





Reported Meds & Active Scripts


Active


Metformin (Metformin HCl) 1,000 Mg Tab 1,000 Mg PO BIDPC


Lipitor (Atorvastatin Calcium) 20 Mg Tab 20 Mg PO HS


Lisinopril 10 Mg Tab 10 Mg PO DAILY


Norvasc (Amlodipine Besylate) 10 Mg Tab 10 Mg PO DAILY


Zantac (Ranitidine HCl) 150 Mg Tab 150 Mg PO BID


Reported


Vitamin C (Ascorbic Acid) 250 Mg Chew 100 Mg CHEW DAILY





Narrative Medication


List of his home medications reviewed from the nursing note.





Review of Systems


Except as stated in HPI:  all other systems reviewed are Neg


Neurologic:  Positive: Headache





Physical Exam


Narrative


GENERAL: Awake, alert, mild distress


SKIN: Focused skin assessment warm/dry.


HEAD: Atraumatic. Normocephalic. 


EYES: Pupils equal and round. No scleral icterus. No injection or drainage. 


ENT: No nasal bleeding or discharge.  Mucous membranes pink and moist.


NECK: Trachea midline. No JVD. 


CARDIOVASCULAR: Regular rate and rhythm.  No murmur appreciated.


RESPIRATORY: No accessory muscle use. Clear to auscultation. Breath sounds 

equal bilaterally. 


GASTROINTESTINAL: Abdomen soft, non-tender, nondistended. Hepatic and splenic 

margins not palpable. 


MUSCULOSKELETAL: No obvious deformities. No clubbing.  No cyanosis.  No edema. 


NEUROLOGICAL: Awake and alert. No obvious cranial nerve deficits.  Motor 

grossly within normal limits. Normal speech.


PSYCHIATRIC: Appropriate mood and affect; insight and judgment normal.





Data


Data


Last Documented VS





Orders





 Orders


Blood Glucose (11/27/17 17:05)


Acetaminophen (Tylenol) (11/27/17 17:15)


Insulin Human Regular Inj (Novolin R Inj (11/27/17 17:15)


Ed Discharge Order (11/27/17 18:02)








[facility]


Medical Decision Making


Medical Screen Exam Complete:  Yes


Emergency Medical Condition:  Yes


Medical Record Reviewed:  Yes


Differential Diagnosis


Hyperglycemia, poor diet control


Narrative Course


5:59 PM patient was educated regarding the diabetic diet.  I've also encouraged 

him to go online and look up for ideal diabetic diet.  Patient was also given 

10 mEq of subcutaneous regular insulin and his blood sugar will be checked.  

Deficits below 300 I'll discharge him home.





Procedures


EKG Prior to Arrival:  No





Diagnosis





 Primary Impression:  


 Hyperglycemia


 Additional Impression:  


 Poorly controlled diabetes mellitus


Referrals:  


[facility]





***Additional Instructions:  


Continue taking and check sugar.  He should be very careful in terms of your 

diet.  Do not eat anything that is high in sugar/carbohydrate content.  Return 

to the ER if the condition worsens or any other new concerns.  Please follow-up 

with your primary care physician.


***Med/Other Pt SpecificInfo:  No Change to Meds


Disposition:  01 DISCHARGE HOME


Condition:  Stable











Yohan Xiong MD Nov 27, 2017 16:53

## 2017-12-26 ENCOUNTER — HOSPITAL ENCOUNTER (EMERGENCY)
Dept: HOSPITAL 17 - PHED | Age: 42
Discharge: HOME | End: 2017-12-26
Payer: SELF-PAY

## 2017-12-26 VITALS — WEIGHT: 229.94 LBS | BODY MASS INDEX: 34.06 KG/M2 | HEIGHT: 69 IN

## 2017-12-26 VITALS — SYSTOLIC BLOOD PRESSURE: 150 MMHG | DIASTOLIC BLOOD PRESSURE: 91 MMHG

## 2017-12-26 VITALS — OXYGEN SATURATION: 96 %

## 2017-12-26 VITALS
HEART RATE: 100 BPM | TEMPERATURE: 98.4 F | RESPIRATION RATE: 16 BRPM | SYSTOLIC BLOOD PRESSURE: 139 MMHG | OXYGEN SATURATION: 97 % | DIASTOLIC BLOOD PRESSURE: 88 MMHG

## 2017-12-26 DIAGNOSIS — Z86.73: ICD-10-CM

## 2017-12-26 DIAGNOSIS — F17.210: ICD-10-CM

## 2017-12-26 DIAGNOSIS — I25.2: ICD-10-CM

## 2017-12-26 DIAGNOSIS — I10: ICD-10-CM

## 2017-12-26 DIAGNOSIS — Z87.442: ICD-10-CM

## 2017-12-26 DIAGNOSIS — R07.9: Primary | ICD-10-CM

## 2017-12-26 DIAGNOSIS — R42: ICD-10-CM

## 2017-12-26 DIAGNOSIS — E11.9: ICD-10-CM

## 2017-12-26 DIAGNOSIS — E78.00: ICD-10-CM

## 2017-12-26 LAB
ALBUMIN SERPL-MCNC: 3.4 GM/DL (ref 3.4–5)
ALP SERPL-CCNC: 140 U/L (ref 45–117)
ALT SERPL-CCNC: 43 U/L (ref 12–78)
AST SERPL-CCNC: 16 U/L (ref 15–37)
BASOPHILS # BLD AUTO: 0 TH/MM3 (ref 0–0.2)
BASOPHILS NFR BLD: 0.6 % (ref 0–2)
BILIRUB SERPL-MCNC: 0.3 MG/DL (ref 0.2–1)
BUN SERPL-MCNC: 14 MG/DL (ref 7–18)
CALCIUM SERPL-MCNC: 8.1 MG/DL (ref 8.5–10.1)
CHLORIDE SERPL-SCNC: 104 MEQ/L (ref 98–107)
CREAT SERPL-MCNC: 0.78 MG/DL (ref 0.6–1.3)
EOSINOPHIL # BLD: 0.2 TH/MM3 (ref 0–0.4)
EOSINOPHIL NFR BLD: 2.1 % (ref 0–4)
ERYTHROCYTE [DISTWIDTH] IN BLOOD BY AUTOMATED COUNT: 12.6 % (ref 11.6–17.2)
GFR SERPLBLD BASED ON 1.73 SQ M-ARVRAT: 110 ML/MIN (ref 89–?)
GLUCOSE SERPL-MCNC: 260 MG/DL (ref 74–106)
HCO3 BLD-SCNC: 26.4 MEQ/L (ref 21–32)
HCT VFR BLD CALC: 46.5 % (ref 39–51)
HGB BLD-MCNC: 15.3 GM/DL (ref 13–17)
INR PPP: 1 RATIO
LYMPHOCYTES # BLD AUTO: 1.5 TH/MM3 (ref 1–4.8)
LYMPHOCYTES NFR BLD AUTO: 20 % (ref 9–44)
MCH RBC QN AUTO: 27.2 PG (ref 27–34)
MCHC RBC AUTO-ENTMCNC: 32.8 % (ref 32–36)
MCV RBC AUTO: 83.1 FL (ref 80–100)
MONOCYTE #: 0.5 TH/MM3 (ref 0–0.9)
MONOCYTES NFR BLD: 6.1 % (ref 0–8)
NEUTROPHILS # BLD AUTO: 5.3 TH/MM3 (ref 1.8–7.7)
NEUTROPHILS NFR BLD AUTO: 71.2 % (ref 16–70)
PLATELET # BLD: 227 TH/MM3 (ref 150–450)
PMV BLD AUTO: 8.7 FL (ref 7–11)
PROT SERPL-MCNC: 7.1 GM/DL (ref 6.4–8.2)
PROTHROMBIN TIME: 9.8 SEC (ref 9.8–11.6)
RBC # BLD AUTO: 5.6 MIL/MM3 (ref 4.5–5.9)
SODIUM SERPL-SCNC: 139 MEQ/L (ref 136–145)
TROPONIN I SERPL-MCNC: (no result) NG/ML (ref 0.02–0.05)
WBC # BLD AUTO: 7.5 TH/MM3 (ref 4–11)

## 2017-12-26 PROCEDURE — 99285 EMERGENCY DEPT VISIT HI MDM: CPT

## 2017-12-26 PROCEDURE — 85610 PROTHROMBIN TIME: CPT

## 2017-12-26 PROCEDURE — 70450 CT HEAD/BRAIN W/O DYE: CPT

## 2017-12-26 PROCEDURE — 80053 COMPREHEN METABOLIC PANEL: CPT

## 2017-12-26 PROCEDURE — 82550 ASSAY OF CK (CPK): CPT

## 2017-12-26 PROCEDURE — 85730 THROMBOPLASTIN TIME PARTIAL: CPT

## 2017-12-26 PROCEDURE — 84484 ASSAY OF TROPONIN QUANT: CPT

## 2017-12-26 PROCEDURE — 85025 COMPLETE CBC W/AUTO DIFF WBC: CPT

## 2017-12-26 PROCEDURE — 93005 ELECTROCARDIOGRAM TRACING: CPT

## 2017-12-26 NOTE — RADRPT
EXAM DATE/TIME:  12/26/2017 10:12 

 

[facility] COMPARISON:     

CT BRAIN W/O CONTRAST, January 17, 2017, 21:13.

 

 

INDICATIONS :     

Headache, dizziness and nose bleed.

                      

 

RADIATION DOSE:     

60.20 CTDIvol (mGy) 

 

 

 

MEDICAL HISTORY :     

Cerebrovascular disease. Hypertension. Anticoagulant therapy.

 

SURGICAL HISTORY :       

Orthopedic surgery.

 

ENCOUNTER:      

Initial

 

ACUITY:      

4 - 6 days

 

PAIN SCALE:      

4/10

 

LOCATION:        

cranial 

 

TECHNIQUE:     

Multiple contiguous axial images were obtained of the head.  Using automated exposure control and adj
ustment of the mA and/or kV according to patient size, radiation dose was kept as low as reasonably a
chievable to obtain optimal diagnostic quality images.   DICOM format image data is available electro
nically for review and comparison.  

 

FINDINGS:     

 

CEREBRUM:     

The ventricles are normal for age.  No evidence of midline shift, mass lesion, hemorrhage or acute in
farction.  No extra-axial fluid collections are seen.

 

POSTERIOR FOSSA:     

The cerebellum and brainstem are intact.  The 4th ventricle is midline.  The cerebellopontine angle i
s unremarkable.

 

EXTRACRANIAL:     

The visualized portion of the orbits is intact.

 

SKULL:     

The calvaria is intact.  No evidence of skull fracture.

 

CONCLUSION:     

No acute disease.  No significant change has occurred.  

 

 

 

 Melecio Jansen MD on December 26, 2017 at 10:22           

Board Certified Radiologist.

 This report was verified electronically.

## 2017-12-26 NOTE — PD
HPI


Chief Complaint:  Dizziness


Time Seen by Provider:  09:59


Travel History


International Travel<30 days:  No


Contact w/Intl Traveler<30days:  No


Traveled to known affect area:  No





History of Present Illness


HPI


42-year-old male patient with history of diabetes, hypertension, presents to 

the ER today with 2-3 weeks history of lightheadedness, feels like he is 

falling to the left sometimes when he walks, blurry vision, and states that he 

had 1 episode of epistaxis on Thursday.  He denies any chest pains, trouble 

breathing, vomiting, or other symptoms.  He denies any numbness or weakness.  

He states that he feels that something is not right.  He also states that 

lately he has had to wear reading glasses which was not occurring in the past 

month.





Modifying Factors: None


Associated Signs & Symptoms: Lightheadedness, feels like he is falling to the 

left sometimes, blurry vision, epistaxis


Risk Factors: Diabetic, hypertension





PFSH


Past Medical History


Hx Anticoagulant Therapy:  Yes (type 2)


Heart Rhythm Problems:  No


Cardiac Catheterization:  Yes (age 36)


Cardiovascular Problems:  Yes (htn on meds)


High Cholesterol:  Yes


Congestive Heart Failure:  No


Cerebrovascular Accident:  Yes (TIA 2013)


Diabetes:  Yes


Diminished Hearing:  No


Gastrointestinal Disorders:  Yes


Hypertension:  Yes


Kidney Stones:  Yes


Respiratory:  Yes


Immunizations Current:  Yes


Myocardial Infarction:  Yes


Pneumonia:  Yes (WITH MRSA)


Sleep Apnea:  Yes (NO OFFICIAL DIAGNOSIS)





Past Surgical History


Cardiac Surgery:  Yes (HEART CATHERIZATION)


Coronary Artery Bypass Graft:  No


Joint Replacement:  Yes (RIGHT KNEE 1992)


Oral Surgery:  Yes (DENTAL)


Thoracic Surgery:  Yes (RIGHT KNEE)


Other Surgery:  Yes





Family History


Family Hypercholesterolemia:  Yes





Social History


Alcohol Use:  No


Tobacco Use:  Yes (1 cigarette/daily)


Substance Use:  No





Allergies-Medications


(Allergen,Severity, Reaction):  


Coded Allergies:  


     Sulfa (Sulfonamide Antibiotics) (Verified  Allergy, Severe, Anaphylaxis, 12 /26/17)


     heparin (Verified  Allergy, Severe, Bleeding, 12/26/17)


     penicillin G (Verified  Allergy, Severe, Anaphylaxis, 12/26/17)


Reported Meds & Prescriptions





Reported Meds & Active Scripts


Active


Metformin (Metformin HCl) 1,000 Mg Tab 1,000 Mg PO BIDPC


Lipitor (Atorvastatin Calcium) 20 Mg Tab 20 Mg PO HS


Lisinopril 10 Mg Tab 10 Mg PO DAILY


Norvasc (Amlodipine Besylate) 10 Mg Tab 10 Mg PO DAILY








Review of Systems


Except as stated in HPI:  all other systems reviewed are Neg





Physical Exam


Narrative


GENERAL: Well-developed middle age white male patient currently in no acute 

distress.  Awake and oriented 3.


SKIN: Focused skin assessment warm/dry.


HEAD: Atraumatic. Normocephalic. 


EYES: Pupils equal and round. No scleral icterus. No injection or drainage. 


ENT: No nasal bleeding or discharge.  Mucous membranes pink and moist.


NECK: Trachea midline. No JVD.  Supple.


CARDIOVASCULAR: Regular rate and rhythm.  No murmur appreciated.


RESPIRATORY: No accessory muscle use. Clear to auscultation. Breath sounds 

equal bilaterally. 


GASTROINTESTINAL: Abdomen soft, non-tender, nondistended. Hepatic and splenic 

margins not palpable. 


MUSCULOSKELETAL: No obvious deformities. No clubbing.  No cyanosis.  No edema. 


NEUROLOGICAL: Awake and alert. No obvious cranial nerve deficits.  Motor 

grossly within normal limits. Normal speech.  No pronator drift.  Ambulatory in 

the ER without issues.  Normal Romberg testing.


PSYCHIATRIC: Mildly anxious mood and affect; insight and judgment normal.





Data


Data


Last Documented VS





Vital Signs








  Date Time  Temp Pulse Resp B/P (MAP) Pulse Ox O2 Delivery O2 Flow Rate FiO2


 


12/26/17 10:14  88 16  96 Room Air  


 


12/26/17 09:34 98.4   139/88 (105)    








Orders





 Orders


Electrocardiogram (12/26/17 09:59)


Complete Blood Count With Diff (12/26/17 09:59)


Comprehensive Metabolic Panel (12/26/17 09:59)


Ckmb (Isoenzyme) Profile (12/26/17 09:59)


Troponin I (12/26/17 09:59)


Act Partial Throm Time (Ptt) (12/26/17 09:59)


Prothrombin Time / Inr (Pt) (12/26/17 09:59)


Ct Brain W/O Iv Contrast(Rout) (12/26/17 09:59)


Ecg Monitoring (12/26/17 09:59)


Iv Access Insert/Monitor (12/26/17 09:59)


Oximetry (12/26/17 09:59)


Sodium Chloride 0.9% Flush (Ns Flush) (12/26/17 10:00)


Ed Discharge Order (12/26/17 11:12)





Labs





Laboratory Tests








Test


  12/26/17


10:11


 


White Blood Count 7.5 TH/MM3 


 


Red Blood Count 5.60 MIL/MM3 


 


Hemoglobin 15.3 GM/DL 


 


Hematocrit 46.5 % 


 


Mean Corpuscular Volume 83.1 FL 


 


Mean Corpuscular Hemoglobin 27.2 PG 


 


Mean Corpuscular Hemoglobin


Concent 32.8 % 


 


 


Red Cell Distribution Width 12.6 % 


 


Platelet Count 227 TH/MM3 


 


Mean Platelet Volume 8.7 FL 


 


Neutrophils (%) (Auto) 71.2 % 


 


Lymphocytes (%) (Auto) 20.0 % 


 


Monocytes (%) (Auto) 6.1 % 


 


Eosinophils (%) (Auto) 2.1 % 


 


Basophils (%) (Auto) 0.6 % 


 


Neutrophils # (Auto) 5.3 TH/MM3 


 


Lymphocytes # (Auto) 1.5 TH/MM3 


 


Monocytes # (Auto) 0.5 TH/MM3 


 


Eosinophils # (Auto) 0.2 TH/MM3 


 


Basophils # (Auto) 0.0 TH/MM3 


 


CBC Comment DIFF FINAL 


 


Differential Comment  


 


Prothrombin Time 9.8 SEC 


 


Prothromb Time International


Ratio 1.0 RATIO 


 


 


Activated Partial


Thromboplast Time 25.0 SEC 


 


 


Blood Urea Nitrogen 14 MG/DL 


 


Creatinine 0.78 MG/DL 


 


Random Glucose 260 MG/DL 


 


Total Protein 7.1 GM/DL 


 


Albumin 3.4 GM/DL 


 


Calcium Level 8.1 MG/DL 


 


Alkaline Phosphatase 140 U/L 


 


Aspartate Amino Transf


(AST/SGOT) 16 U/L 


 


 


Alanine Aminotransferase


(ALT/SGPT) 43 U/L 


 


 


Total Bilirubin 0.3 MG/DL 


 


Sodium Level 139 MEQ/L 


 


Potassium Level 3.4 MEQ/L 


 


Chloride Level 104 MEQ/L 


 


Carbon Dioxide Level 26.4 MEQ/L 


 


Anion Gap 9 MEQ/L 


 


Estimat Glomerular Filtration


Rate 110 ML/MIN 


 


 


Total Creatine Kinase 42 U/L 


 


Troponin I


  LESS THAN 0.02


NG/ML











MDM


Medical Decision Making


Medical Screen Exam Complete:  Yes


Emergency Medical Condition:  Yes


Medical Record Reviewed:  Yes


Interpretation(s)


EKG shows NSR, no ST elevation or depression, and no arrhythmias.  No  

significant T-wave inversions.








Laboratory Tests








Test


  12/26/17


10:11


 


Neutrophils (%) (Auto)


  71.2 %


(16.0-70.0)


 


Random Glucose


  260 MG/DL


()


 


Calcium Level


  8.1 MG/DL


(8.5-10.1)


 


Alkaline Phosphatase


  140 U/L


()


 


Potassium Level


  3.4 MEQ/L


(3.5-5.1)


 


Troponin I


  LESS THAN 0.02


NG/ML








Last 24 hours Impressions








Head CT 12/26/17 0959 Signed





Impressions: 





 Service Date/Time:  Tuesday, December 26, 2017 10:12 - CONCLUSION:  No acute 





 disease.  No significant change has occurred.       Melecio Jansen MD 








Differential Diagnosis


Dizziness, blurry vision, epistaxis: Hypertension versus metabolic issues 

versus hyperglycemia versus CVA


Narrative Course


EKG does not show any significant dysrhythmias or any signs of acute ST-T 

elevations or depressions.  Lab work is otherwise unremarkable.  Cardiac 

enzymes are negative.  CT the brain is negative for any signs of acute 

processes.  I do not see any focal neurological deficits currently.  However, 

while in the ER he also mentions that he is been having intermittent chest 

discomfort that it only occurs every once in a while lasts a few minutes at a 

time.  He does not know any exacerbating or alleviating factors.  He states 

that sometimes he gets to a 9 out of 10.  On initial evaluation, he denied any 

chest pain.  However, as I was talking to him about discharge, he states that 

the chest pain just happened again.  At this point, I would recommend that he 

get further cardiac evaluation which includes provocative testing such as 

stress testing with a cardiologist.  I have also offered to admit him to the 

[facility] for chest pain center for further evaluation.  He is declining at this 

time, stating that he has to go back to work.  Considering the chest pain, I 

would recommend that he follows up as an outpatient with cardiology for further 

testing before returning to work.  He should return for any worsening in chest 

pains or new symptoms as needed.  The plan was discussed with him and he states 

understanding.  In addition, he needs to also follow-up with primary care 

physician for care of his medical problems and current complaint of dizziness.  

He should get his vision checked with his ophthalmologist as well.  The plan 

was discussed with him and he states understanding.  Of note, or has been 

stress testing and myocardial scan done on him in 2015 which were both low risk.





Diagnosis





 Primary Impression:  


 Chest pain


 Additional Impression:  


 Dizziness





***Additional Instructions:  


She should return for any worsening and chest pain episodes, dizziness, or new 

symptoms as needed.  Follow-up with your primary care doctor and a cardiologist 

for further evaluation.


Disposition:  01 DISCHARGE HOME


Condition:  Stable











Afsaneh Vanessa MD Dec 26, 2017 10:04

## 2017-12-26 NOTE — EKG
Date Performed: 2017       Time Performed: 10:07:48

 

PTAGE:      42 years

 

EKG:      Sinus rhythm 

 

 NONSPECIFIC ST-T WAVE CHANGES NORMAL ECG Since 

 

 PREVIOUS TRACING            , no significant change noted PREVIOUS TRACIN2017 06.22

 

DOCTOR:   Pauly Stahl  Interpretating Date/Time  2017 17:10:24

## 2018-02-12 ENCOUNTER — HOSPITAL ENCOUNTER (EMERGENCY)
Dept: HOSPITAL 17 - PHED | Age: 43
LOS: 1 days | Discharge: HOME | End: 2018-02-13
Payer: COMMERCIAL

## 2018-02-12 VITALS
OXYGEN SATURATION: 95 % | TEMPERATURE: 98.9 F | SYSTOLIC BLOOD PRESSURE: 154 MMHG | DIASTOLIC BLOOD PRESSURE: 91 MMHG | RESPIRATION RATE: 16 BRPM | HEART RATE: 84 BPM

## 2018-02-12 VITALS — HEIGHT: 69 IN | BODY MASS INDEX: 33.93 KG/M2 | WEIGHT: 229.06 LBS

## 2018-02-12 DIAGNOSIS — M25.522: ICD-10-CM

## 2018-02-12 DIAGNOSIS — I10: ICD-10-CM

## 2018-02-12 DIAGNOSIS — E11.9: ICD-10-CM
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
## 2018-02-12 DIAGNOSIS — Z79.01: ICD-10-CM

## 2018-02-12 DIAGNOSIS — Z87.19: ICD-10-CM

## 2018-02-12 DIAGNOSIS — Z79.84: ICD-10-CM

## 2018-02-12 DIAGNOSIS — Z72.0: ICD-10-CM

## 2018-02-12 DIAGNOSIS — I25.2: ICD-10-CM

## 2018-02-12 DIAGNOSIS — E78.00: ICD-10-CM

## 2018-02-12 DIAGNOSIS — R07.81: Primary | ICD-10-CM

## 2018-02-12 DIAGNOSIS — Z86.73: ICD-10-CM

## 2018-02-12 DIAGNOSIS — Z87.442: ICD-10-CM

## 2018-02-12 PROCEDURE — 96372 THER/PROPH/DIAG INJ SC/IM: CPT

## 2018-02-12 PROCEDURE — 99284 EMERGENCY DEPT VISIT MOD MDM: CPT

## 2018-02-12 PROCEDURE — 73080 X-RAY EXAM OF ELBOW: CPT

## 2018-02-12 PROCEDURE — 71101 X-RAY EXAM UNILAT RIBS/CHEST: CPT
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
## 2018-02-13 VITALS — SYSTOLIC BLOOD PRESSURE: 152 MMHG | DIASTOLIC BLOOD PRESSURE: 69 MMHG
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
